# Patient Record
Sex: FEMALE | Race: WHITE | NOT HISPANIC OR LATINO | Employment: PART TIME | URBAN - METROPOLITAN AREA
[De-identification: names, ages, dates, MRNs, and addresses within clinical notes are randomized per-mention and may not be internally consistent; named-entity substitution may affect disease eponyms.]

---

## 2023-04-21 ENCOUNTER — TELEMEDICINE (OUTPATIENT)
Dept: PRIMARY CARE | Facility: CLINIC | Age: 43
End: 2023-04-21
Payer: COMMERCIAL

## 2023-04-21 DIAGNOSIS — J01.00 SUBACUTE MAXILLARY SINUSITIS: Primary | ICD-10-CM

## 2023-04-21 PROBLEM — M62.08 DIASTASIS RECTI: Status: ACTIVE | Noted: 2023-04-21

## 2023-04-21 PROBLEM — E73.9 LACTOSE INTOLERANCE: Status: ACTIVE | Noted: 2023-04-21

## 2023-04-21 PROBLEM — J45.990 EXERCISE-INDUCED ASTHMA (HHS-HCC): Status: ACTIVE | Noted: 2023-04-21

## 2023-04-21 PROBLEM — M21.40 FLAT FOOT: Status: ACTIVE | Noted: 2023-04-21

## 2023-04-21 PROBLEM — D72.819 WBC DECREASED: Status: ACTIVE | Noted: 2023-04-21

## 2023-04-21 PROBLEM — R10.33 UMBILICAL PAIN: Status: ACTIVE | Noted: 2023-04-21

## 2023-04-21 PROBLEM — J30.9 ALLERGIC RHINITIS: Status: ACTIVE | Noted: 2023-04-21

## 2023-04-21 PROBLEM — E55.9 VITAMIN D DEFICIENCY: Status: ACTIVE | Noted: 2023-04-21

## 2023-04-21 PROBLEM — N92.0 MENORRHAGIA WITH REGULAR CYCLE: Status: ACTIVE | Noted: 2023-04-21

## 2023-04-21 PROBLEM — E04.1 THYROID NODULE: Status: ACTIVE | Noted: 2023-04-21

## 2023-04-21 PROBLEM — J02.9 SORE THROAT: Status: ACTIVE | Noted: 2023-04-21

## 2023-04-21 PROCEDURE — 99213 OFFICE O/P EST LOW 20 MIN: CPT | Performed by: NURSE PRACTITIONER

## 2023-04-21 RX ORDER — VIT C/E/ZN/COPPR/LUTEIN/ZEAXAN 250MG-90MG
50 CAPSULE ORAL
COMMUNITY
Start: 2021-12-21

## 2023-04-21 RX ORDER — CETIRIZINE HYDROCHLORIDE 10 MG/1
10 TABLET ORAL AS NEEDED
COMMUNITY

## 2023-04-21 RX ORDER — ERYTHROMYCIN 5 MG/G
OINTMENT OPHTHALMIC
COMMUNITY
End: 2023-05-09 | Stop reason: ALTCHOICE

## 2023-04-21 RX ORDER — AMOXICILLIN AND CLAVULANATE POTASSIUM 875; 125 MG/1; MG/1
875 TABLET, FILM COATED ORAL 2 TIMES DAILY
Qty: 14 TABLET | Refills: 0 | Status: SHIPPED | OUTPATIENT
Start: 2023-04-21 | End: 2023-04-28

## 2023-04-21 ASSESSMENT — ENCOUNTER SYMPTOMS
OCCASIONAL FEELINGS OF UNSTEADINESS: 0
DEPRESSION: 0
LOSS OF SENSATION IN FEET: 0

## 2023-04-21 ASSESSMENT — PATIENT HEALTH QUESTIONNAIRE - PHQ9
2. FEELING DOWN, DEPRESSED OR HOPELESS: NOT AT ALL
SUM OF ALL RESPONSES TO PHQ9 QUESTIONS 1 AND 2: 0
1. LITTLE INTEREST OR PLEASURE IN DOING THINGS: NOT AT ALL

## 2023-04-21 NOTE — PROGRESS NOTES
An interactive audio and video telecommunication system which permits real time communications between the patient (at the originating site) and provider (at the distant site) was utilized to provide this telehealth service.  Verbal consent was requested and obtained from the patient for this telehealth visit.     Subjective   Patient ID: Camelia Brown is a 43 y.o. female who presents for Sinus Problem.    Started Monday  Pressure in forehead  And in cheecks  Yellow mucous  Streaks or dots blood  Currently taking zyrtec  Mucinex D  Had eye infections last Wednesday  Erythromycin eye gel  Will finish today  No fevers sweats or chill  No SOB wheeze   Minimal coughing  No HA, just pressure          Review of Systems  ROS completely negative except what was mentioned in the HPI.  Problem List, surgical, social, and family histories which were reviewed and updated as necessary within the EMR. I also personally reviewed the notes, labs, and imaging that pertained to what was documented or discussed in the HPI.      Objective   Physical Exam  Vitals and nursing note reviewed.   Constitutional:       Appearance: Normal appearance.   HENT:      Head: Normocephalic and atraumatic.      Right Ear: External ear normal.      Left Ear: External ear normal.      Nose: Nose normal.      Mouth/Throat:      Mouth: Mucous membranes are moist.   Eyes:      Extraocular Movements: Extraocular movements intact.      Conjunctiva/sclera: Conjunctivae normal.   Pulmonary:      Effort: Pulmonary effort is normal.   Musculoskeletal:      Cervical back: Normal range of motion and neck supple.   Neurological:      General: No focal deficit present.      Mental Status: She is alert and oriented to person, place, and time. Mental status is at baseline.   Psychiatric:         Mood and Affect: Mood normal.         Behavior: Behavior normal.         Thought Content: Thought content normal.         Judgment: Judgment normal.         There were no  vitals taken for this visit.    Assessment/Plan   Problem List Items Addressed This Visit       Subacute maxillary sinusitis - Primary     Discussed flonase, mucinex, and nasal saline  Short term use of decongestants  Augmentin if not improving         Relevant Medications    amoxicillin-pot clavulanate (Augmentin) 875-125 mg tablet

## 2023-04-21 NOTE — ASSESSMENT & PLAN NOTE
Discussed flonase, mucinex, and nasal saline  Short term use of decongestants  Augmentin if not improving

## 2023-04-28 ENCOUNTER — TELEPHONE (OUTPATIENT)
Dept: PRIMARY CARE | Facility: CLINIC | Age: 43
End: 2023-04-28
Payer: COMMERCIAL

## 2023-04-28 DIAGNOSIS — R05.9 COUGH, UNSPECIFIED TYPE: ICD-10-CM

## 2023-04-28 RX ORDER — ALBUTEROL SULFATE 90 UG/1
2 AEROSOL, METERED RESPIRATORY (INHALATION) EVERY 4 HOURS PRN
Qty: 8 G | Refills: 5 | Status: SHIPPED | OUTPATIENT
Start: 2023-04-28 | End: 2024-04-26 | Stop reason: SDUPTHER

## 2023-04-28 RX ORDER — BENZONATATE 100 MG/1
100 CAPSULE ORAL 3 TIMES DAILY PRN
Qty: 20 CAPSULE | Refills: 0 | Status: SHIPPED | OUTPATIENT
Start: 2023-04-28 | End: 2023-05-05

## 2023-04-28 NOTE — TELEPHONE ENCOUNTER
Regarding: Dr De La Rosa   Contact: [phone]  Yes I would like to try whatever the  recommends.     [facility] please.     Thank you!

## 2023-05-09 ENCOUNTER — OFFICE VISIT (OUTPATIENT)
Dept: PRIMARY CARE | Facility: CLINIC | Age: 43
End: 2023-05-09
Payer: COMMERCIAL

## 2023-05-09 VITALS
TEMPERATURE: 97.2 F | WEIGHT: 150.4 LBS | HEART RATE: 116 BPM | BODY MASS INDEX: 22.87 KG/M2 | OXYGEN SATURATION: 96 % | SYSTOLIC BLOOD PRESSURE: 111 MMHG | DIASTOLIC BLOOD PRESSURE: 62 MMHG

## 2023-05-09 DIAGNOSIS — J01.00 SUBACUTE MAXILLARY SINUSITIS: ICD-10-CM

## 2023-05-09 DIAGNOSIS — J02.8 PHARYNGITIS DUE TO OTHER ORGANISM: Primary | ICD-10-CM

## 2023-05-09 LAB — POC RAPID STREP: NEGATIVE

## 2023-05-09 PROCEDURE — 87880 STREP A ASSAY W/OPTIC: CPT | Performed by: NURSE PRACTITIONER

## 2023-05-09 PROCEDURE — 1036F TOBACCO NON-USER: CPT | Performed by: NURSE PRACTITIONER

## 2023-05-09 PROCEDURE — 99213 OFFICE O/P EST LOW 20 MIN: CPT | Performed by: NURSE PRACTITIONER

## 2023-05-09 RX ORDER — CEFDINIR 300 MG/1
300 CAPSULE ORAL 2 TIMES DAILY
Qty: 20 CAPSULE | Refills: 0 | Status: SHIPPED | OUTPATIENT
Start: 2023-05-09 | End: 2023-05-19

## 2023-05-09 RX ORDER — GUAIFENESIN 600 MG/1
1200 TABLET, EXTENDED RELEASE ORAL AS NEEDED
COMMUNITY
End: 2024-04-26 | Stop reason: WASHOUT

## 2023-05-09 RX ORDER — BUTYROSPERMUM PARKII(SHEA BUTTER), SIMMONDSIA CHINENSIS (JOJOBA) SEED OIL, ALOE BARBADENSIS LEAF EXTRACT .01; 1; 3.5 G/100G; G/100G; G/100G
250 LIQUID TOPICAL DAILY
COMMUNITY
End: 2023-11-09 | Stop reason: ALTCHOICE

## 2023-05-09 ASSESSMENT — PATIENT HEALTH QUESTIONNAIRE - PHQ9
SUM OF ALL RESPONSES TO PHQ9 QUESTIONS 1 AND 2: 0
1. LITTLE INTEREST OR PLEASURE IN DOING THINGS: NOT AT ALL
2. FEELING DOWN, DEPRESSED OR HOPELESS: NOT AT ALL

## 2023-05-09 NOTE — PROGRESS NOTES
Subjective   Patient ID: Camelia Brown is a 43 y.o. female who presents for sick visit (Ongoing for 1 month/Cough  sputum is yellow/headache/low grade fever and chills started yesterday/Previously had sinus infection).  Sick for 1 mo  2 weeks ago took augmentin  Got better then returned  Coughing  Sputum is yellow  HA  Low grade fever  Chills yesterday  Went to  1.5 weeks ago  Got steroid, helped some  Cant sleep due to cough  No ear pain  Random mild sob, infrequent          Review of Systems  ROS completely negative except what was mentioned in the HPI.  Problem List, surgical, social, and family histories which were reviewed and updated as necessary within the EMR. I also personally reviewed the notes, labs, and imaging that pertained to what was documented or discussed in the HPI.      Objective   Physical Exam  Vitals and nursing note reviewed.   Constitutional:       General: She is not in acute distress.     Appearance: Normal appearance.   HENT:      Head: Normocephalic and atraumatic.      Right Ear: Tympanic membrane, ear canal and external ear normal.      Left Ear: Tympanic membrane, ear canal and external ear normal.      Nose: Nose normal.      Mouth/Throat:      Mouth: Mucous membranes are moist.      Pharynx: Oropharyngeal exudate and posterior oropharyngeal erythema present.   Eyes:      Extraocular Movements: Extraocular movements intact.      Conjunctiva/sclera: Conjunctivae normal.      Pupils: Pupils are equal, round, and reactive to light.   Cardiovascular:      Rate and Rhythm: Normal rate and regular rhythm.      Heart sounds: Normal heart sounds.   Pulmonary:      Effort: Pulmonary effort is normal.      Breath sounds: Normal breath sounds.   Musculoskeletal:         General: Normal range of motion.      Cervical back: Normal range of motion and neck supple.   Skin:     General: Skin is warm and dry.   Neurological:      General: No focal deficit present.      Mental Status: She is alert  and oriented to person, place, and time. Mental status is at baseline.   Psychiatric:         Mood and Affect: Mood normal.         Behavior: Behavior normal.         Thought Content: Thought content normal.         Judgment: Judgment normal.         /62   Pulse (!) 116   Temp 36.2 °C (97.2 °F)   Wt 68.2 kg (150 lb 6.4 oz)   SpO2 96%   BMI 22.87 kg/m²     Assessment/Plan   Problem List Items Addressed This Visit       Subacute maxillary sinusitis    Relevant Medications    cefdinir (Omnicef) 300 mg capsule     Other Visit Diagnoses       Pharyngitis due to other organism    -  Primary    Relevant Medications    cefdinir (Omnicef) 300 mg capsule    Other Relevant Orders    POCT Rapid Strep A manually resulted (Completed)

## 2023-05-12 ENCOUNTER — OFFICE VISIT (OUTPATIENT)
Dept: PRIMARY CARE | Facility: CLINIC | Age: 43
End: 2023-05-12
Payer: COMMERCIAL

## 2023-05-12 ENCOUNTER — LAB (OUTPATIENT)
Dept: LAB | Facility: LAB | Age: 43
End: 2023-05-12
Payer: COMMERCIAL

## 2023-05-12 VITALS
TEMPERATURE: 97.6 F | BODY MASS INDEX: 22.81 KG/M2 | OXYGEN SATURATION: 98 % | DIASTOLIC BLOOD PRESSURE: 72 MMHG | HEART RATE: 88 BPM | SYSTOLIC BLOOD PRESSURE: 105 MMHG | WEIGHT: 150 LBS

## 2023-05-12 DIAGNOSIS — R05.3 PERSISTENT COUGH: Primary | ICD-10-CM

## 2023-05-12 DIAGNOSIS — B37.0 THRUSH: ICD-10-CM

## 2023-05-12 DIAGNOSIS — R05.3 PERSISTENT COUGH: ICD-10-CM

## 2023-05-12 PROCEDURE — 36415 COLL VENOUS BLD VENIPUNCTURE: CPT

## 2023-05-12 PROCEDURE — 85025 COMPLETE CBC W/AUTO DIFF WBC: CPT

## 2023-05-12 PROCEDURE — 1036F TOBACCO NON-USER: CPT | Performed by: NURSE PRACTITIONER

## 2023-05-12 PROCEDURE — 99213 OFFICE O/P EST LOW 20 MIN: CPT | Performed by: NURSE PRACTITIONER

## 2023-05-12 RX ORDER — NYSTATIN 100000 [USP'U]/ML
500000 SUSPENSION ORAL 4 TIMES DAILY
Qty: 473 ML | Refills: 0 | Status: SHIPPED | OUTPATIENT
Start: 2023-05-12 | End: 2023-05-22

## 2023-05-12 RX ORDER — AZELASTINE 1 MG/ML
1 SPRAY, METERED NASAL 2 TIMES DAILY
Qty: 30 ML | Refills: 12 | Status: SHIPPED | OUTPATIENT
Start: 2023-05-12 | End: 2024-05-11

## 2023-05-12 RX ORDER — AZITHROMYCIN 250 MG/1
TABLET, FILM COATED ORAL
Qty: 6 TABLET | Refills: 0 | Status: SHIPPED | OUTPATIENT
Start: 2023-05-12 | End: 2023-05-17

## 2023-05-12 NOTE — PROGRESS NOTES
Assessment/Plan   Problem List Items Addressed This Visit    None  Visit Diagnoses       Persistent cough    -  Primary    chest XR given chronicity  complete cefdinir  try astelin    Relevant Medications    azelastine (Astelin) 137 mcg (0.1 %) nasal spray    Other Relevant Orders    XR chest 2 views    CBC and Auto Differential    Thrush        swish swallow nystatin    Relevant Medications    nystatin (Mycostatin) 100,000 unit/mL suspension            Subjective   Patient ID: Camelia Brown is a 43 y.o. female who presents for No chief complaint on file..  HPI  Cough x month  Steroids x 2 rounds   Abx x 2 rounds   Currently on cefdinir   Stopped tessalon with steroid  Stopped alb  - no dyspnea  Stopped flonase  Using zyrtec  No fever     Component      Latest Ref Rng 1/27/2023   WBC      4.4 - 11.3 x10E9/L 4.4    RBC      4.00 - 5.20 x10E12/L 4.27    HEMOGLOBIN      12.0 - 16.0 g/dL 13.4    HEMATOCRIT      36.0 - 46.0 % 39.9    MCV      80 - 100 fL 93    MCHC      32.0 - 36.0 g/dL 33.6    Platelets      150 - 450 x10E9/L 205    RED CELL DISTRIBUTION WIDTH      11.5 - 14.5 % 11.9    Neutrophils %      40.0 - 80.0 % 57.6    Immature Granulocytes %, Automated      0.0 - 0.9 % 0.0    Lymphocytes %      13.0 - 44.0 % 29.4    Monocytes %      2.0 - 10.0 % 10.0    Eosinophils %      0.0 - 6.0 % 2.3    Basophils %      0.0 - 2.0 % 0.7    Neutrophils Absolute      1.20 - 7.70 x10E9/L 2.53    Lymphocytes Absolute      1.20 - 4.80 x10E9/L 1.29    Monocytes Absolute      0.10 - 1.00 x10E9/L 0.44    Eosinophils Absolute      0.00 - 0.70 x10E9/L 0.10    Basophils Absolute      0.00 - 0.10 x10E9/L 0.03    GLUCOSE      74 - 99 mg/dL 87    SODIUM      136 - 145 mmol/L 139    POTASSIUM      3.5 - 5.3 mmol/L 4.1    CHLORIDE      98 - 107 mmol/L 104    Bicarbonate      21 - 32 mmol/L 28    Anion Gap      10 - 20 mmol/L 11    Blood Urea Nitrogen      6 - 23 mg/dL 13    Creatinine      0.50 - 1.05 mg/dL 0.79    GFR Female      >90  "mL/min/1.73m2 >90    Calcium      8.6 - 10.3 mg/dL 9.2    Albumin      3.4 - 5.0 g/dL 4.2    Alkaline Phosphatase      33 - 110 U/L 62    Total Protein      6.4 - 8.2 g/dL 7.5    AST      9 - 39 U/L 15    Bilirubin Total      0.0 - 1.2 mg/dL 0.9    ALT      7 - 45 U/L 8    Color, Urine      STRAW,YELLOW  STRAW    Appearance, Urine      CLEAR  CLEAR    Specific Gravity, Urine      1.005 - 1.035  1.008    pH, Urine      5.0 - 8.0  7.0    Protein, Urine      NEGATIVE mg/dL NEGATIVE    Glucose, Urine      NEGATIVE mg/dL NEGATIVE    Blood, Urine      NEGATIVE  NEGATIVE    Ketones, Urine      NEGATIVE mg/dL NEGATIVE    Bilirubin, Urine      NEGATIVE  NEGATIVE    Urobilinogen, Urine      0.0 - 1.9 mg/dL <2.0    Nitrite, Urine      NEGATIVE  NEGATIVE    Leukocyte Esterase, Urine      NEGATIVE  NEGATIVE    CHOLESTEROL      0 - 199 mg/dL 135    HDL CHOLESTEROL      mg/dL 35.7 !    Cholesterol/HDL Ratio 3.8    LDL      0 - 99 mg/dL 85    VLDL      0 - 40 mg/dL 15    TRIGLYCERIDES      0 - 149 mg/dL 73    Vitamin D, 25-Hydroxy, Total      ng/mL 41    Thyroid Stimulating Hormone      0.44 - 3.98 mIU/L 1.67       Review of Systems   All other systems reviewed and are negative.      BP Readings from Last 3 Encounters:   05/12/23 105/72   05/09/23 111/62   01/19/23 109/70      Wt Readings from Last 3 Encounters:   05/12/23 68 kg (150 lb)   05/09/23 68.2 kg (150 lb 6.4 oz)   01/19/23 73.5 kg (162 lb)      BMI:   Estimated body mass index is 22.81 kg/m² as calculated from the following:    Height as of 1/19/23: 1.727 m (5' 8\").    Weight as of this encounter: 68 kg (150 lb).    Objective   Physical Exam  Constitutional:       Appearance: Normal appearance.   HENT:      Head: Normocephalic and atraumatic.      Right Ear: Tympanic membrane normal.      Left Ear: Tympanic membrane normal.      Nose: Nose normal.      Mouth/Throat:      Mouth: Mucous membranes are moist.      Comments: Tongue does have some white coating  L buccal canker " sore   Cardiovascular:      Rate and Rhythm: Normal rate and regular rhythm.   Pulmonary:      Effort: Pulmonary effort is normal.      Comments: JACKIE diminished   Musculoskeletal:         General: Normal range of motion.      Cervical back: Normal range of motion.   Skin:     General: Skin is warm and dry.   Neurological:      General: No focal deficit present.      Mental Status: She is alert.   Psychiatric:         Mood and Affect: Mood normal.

## 2023-05-13 LAB
BAND NEUTROPHILS (10*3/UL) BLOOD MANUAL COUNT - WAM: 0.34 X10E9/L (ref 0–0.7)
BASOPHILS (10*3/UL) IN BLOOD BY MANUAL COUNT - WAM: 0.22 X10E9/L (ref 0–0.1)
BASOPHILS/100 LEUKOCYTES IN BLOOD BY MANUAL COUNT - WAM: 4 %
EOSINOPHILS (10*3/UL) IN BLOOD BY MANUAL COUNT - WAM: 0.28 X10E9/L (ref 0–0.7)
EOSINOPHILS/100 LEUKOCYTES IN BLOOD BY MANUAL COUNT - WAM: 5 % (ref 0–6)
ERYTHROCYTE DISTRIBUTION WIDTH (RATIO) BY AUTOMATED COUNT: 11.9 % (ref 11.5–14.5)
ERYTHROCYTE MEAN CORPUSCULAR HEMOGLOBIN CONCENTRATION (G/DL) BY AUTOMATED: 34.1 G/DL (ref 32–36)
ERYTHROCYTE MEAN CORPUSCULAR VOLUME (FL) BY AUTOMATED COUNT: 93 FL (ref 80–100)
ERYTHROCYTES (10*6/UL) IN BLOOD BY AUTOMATED COUNT: 4.22 X10E12/L (ref 4–5.2)
HEMATOCRIT (%) IN BLOOD BY AUTOMATED COUNT: 39.3 % (ref 36–46)
HEMOGLOBIN (G/DL) IN BLOOD: 13.4 G/DL (ref 12–16)
IMMATURE GRANULOCYTES/100 LEUKOCYTES IN BLOOD BY AUTOMATED COUNT: 0.2 % (ref 0–0.9)
LEUKOCYTES (10*3/UL) IN BLOOD BY AUTOMATED COUNT: 5.6 X10E9/L (ref 4.4–11.3)
LYMPHOCYTES (10*3/UL) IN BLOOD BY MANUAL COUNT - WAM: 2.35 X10E9/L (ref 1.2–4.8)
LYMPHOCYTES VARIANT/100 LEUKOCYTES IN BLOOD - WAM: 4 % (ref 0–2)
LYMPHOCYTES/100 LEUKOCYTES IN BLOOD BY MANUAL COUNT - WAM: 42 % (ref 13–44)
MANUAL DIFFERENTIAL Y/N: NORMAL
MONOCYTES (10*3/UL) IN BLOOD BY MANUAL COUNT - WAM: 0.78 X10E9/L (ref 0.1–1)
MONOCYTES/100 LEUKOCYTES IN BLOOD BY MANUAL COUNT - WAM: 14 % (ref 2–10)
NEUTROPHILS (SEGS+BANDS) (10*3/UL) MANUAL COUNT - WAM: 1.74 X10E9/L (ref 1.2–7.7)
NEUTROPHILS BAND FORM/100 LEUKOCYTES IN BLOOD BY MANUAL COUNT - WAM: 6 % (ref 0–5)
PLATELET CLUMP (PRESENCE) IN BLOOD BY LIGHT MICROSCOPY: PRESENT
PLATELETS (10*3/UL) IN BLOOD AUTOMATED COUNT: 221 X10E9/L (ref 150–450)
PLATELETS GIANT PRESENCE IN BLOOD BY LIGHT MICROSCOPY: NORMAL
RBC MORPHOLOGY IN BLOOD: NORMAL
REACTIVE LYMPH: NORMAL
SEGMENTED NEUTROPHILS (10*3/UL) BLOOD MANUAL - WAM: 1.4 X10E9/L (ref 1.2–7)
SEGMENTED NEUTROPHILS/100 LEUKOCYTES BY MANUAL COUNT -: 25 % (ref 40–80)
VARIANT LYMPHOCYTES (10*3/UL) BLOOD MANUAL COUNT - WAM: 0.22 X10E9/L (ref 0–0.5)

## 2023-05-16 ENCOUNTER — TELEPHONE (OUTPATIENT)
Dept: PRIMARY CARE | Facility: CLINIC | Age: 43
End: 2023-05-16
Payer: COMMERCIAL

## 2023-05-16 NOTE — TELEPHONE ENCOUNTER
Regarding: Dizzy  ----- Message from Arielle Mayer MD sent at 5/16/2023 11:12 AM EDT -----  If the Zpack has helped would finish it (if able)  Can send her in zofran 4mg every 6 hours for nausea if desires #20  Would like her to se ENT for these symptoms: dizziness, pulsatile tinnitus, tongue  Referral in   If wihes to do diflucan 150mg x 5 days for the thrush can send that in also     ----- Message from Camelia Brown to Carmelita LENA Jain-CNP sent at 5/16/2023  8:42 AM -----   Yesterday and today I’ve been dizzy periodically and threw up after being dizzy. I’m assuming it’s the azithromycin. Should I DC the azithromycin or just finish the last one up today?    I’ve also been able to feel my heartbeat in my R ear periodically the past 3 days. That has happened toward the evening and lasts for approximately 30 minutes.     My throat is much better. Still looks like my tongue has thrush though. Still coughing some but not as much. I haven’t been back to work since last Monday. Too fatigued and couldn’t talk. I was going to attempt to go in today but then the dizziness started yesterday and then this morning woke up dizzy.     Thank you for your time,  Camelia Brown

## 2023-05-16 NOTE — TELEPHONE ENCOUNTER
Spoke with Pt  Relayed message  Pt stated she went to the ED today and was already given medication  Relayed that if anything changes to give the office a call

## 2023-05-19 LAB
IGA (MG/DL) IN SER/PLAS: 217 MG/DL (ref 70–400)
IGG (MG/DL) IN SER/PLAS: 1500 MG/DL (ref 700–1600)
IGG (MG/DL) IN SER/PLAS: 1500 MG/DL (ref 700–1600)
IGG SUBCLASS 1 (MG/DL) IN SERUM: 1030 MG/DL (ref 490–1140)
IGG SUBCLASS 2 (MG/DL) IN SERUM: 386 MG/DL (ref 150–640)
IGG SUBCLASS 3 (MG/DL) IN SERUM: 53 MG/DL (ref 11–85)
IGG SUBCLASS 4 (MG/DL) IN SERUM: 22 MG/DL (ref 3–200)
IGM (MG/DL) IN SER/PLAS: 333 MG/DL (ref 40–230)

## 2023-05-23 LAB
COMPLEMENT TOTAL (CH50): 38.9 U/ML (ref 38.7–89.9)
DIPHTHERIA ANTIBODY: 0.9 IU/ML
HAEMOPHILUS INFLUENZAE B AB IGG: 5.4 UG/ML
PNEUMO SEROTYPE INTERPRETATION: NORMAL
SEROTYPE 1, VIRC: >12.18 UG/ML
SEROTYPE 10A(34), VIRC: 10.29 UG/ML
SEROTYPE 11A(43), VIRC: >32.14 UG/ML
SEROTYPE 12F, VIRC: 0.3 UG/ML
SEROTYPE 14, VIRC: 1.03 UG/ML
SEROTYPE 15B(54), VIRC: 1.08 UG/ML
SEROTYPE 17F, VIRC: 1.7 UG/ML
SEROTYPE 18C(56), VIRC: 15.64 UG/ML
SEROTYPE 19A(57), VIRC: 12.43
SEROTYPE 19F, VIRC: 1.27 UG/ML
SEROTYPE 2, VIRC: 0.55 UG/ML
SEROTYPE 20, VIRC: 3.94 UG/ML
SEROTYPE 22F, VIRC: 1.09 UG/ML
SEROTYPE 23F, VIRC: 0.58 UG/ML
SEROTYPE 3, VIRC: 1.34 UG/ML
SEROTYPE 33F(70), VIRC: 0.26 UG/ML
SEROTYPE 4, VIRC: 0.11 UG/ML
SEROTYPE 5, VIRC: 2.75 UG/ML
SEROTYPE 6B(26), VIRC: 0.31 UG/ML
SEROTYPE 7F(51), VIRC: 1.78 UG/ML
SEROTYPE 8, VIRC: 0.2 UG/ML
SEROTYPE 9N, VIRC: 2.4 UG/ML
SEROTYPE 9V(68), VIRC: 2.38 UG/ML

## 2023-06-27 ENCOUNTER — TELEPHONE (OUTPATIENT)
Dept: PRIMARY CARE | Facility: CLINIC | Age: 43
End: 2023-06-27
Payer: COMMERCIAL

## 2023-06-29 ENCOUNTER — CLINICAL SUPPORT (OUTPATIENT)
Dept: PRIMARY CARE | Facility: CLINIC | Age: 43
End: 2023-06-29
Payer: COMMERCIAL

## 2023-06-29 DIAGNOSIS — Z23 ENCOUNTER FOR IMMUNIZATION: Primary | ICD-10-CM

## 2023-06-29 PROCEDURE — 90471 IMMUNIZATION ADMIN: CPT | Performed by: NURSE PRACTITIONER

## 2023-06-29 PROCEDURE — 90732 PPSV23 VACC 2 YRS+ SUBQ/IM: CPT | Performed by: NURSE PRACTITIONER

## 2023-08-07 ENCOUNTER — TELEPHONE (OUTPATIENT)
Dept: PRIMARY CARE | Facility: CLINIC | Age: 43
End: 2023-08-07
Payer: COMMERCIAL

## 2023-08-07 NOTE — TELEPHONE ENCOUNTER
Spoke with Pt  Relayed order is in and can get it 6 weeks from the time the immunization was given  Pt verbally understood

## 2023-08-07 NOTE — TELEPHONE ENCOUNTER
----- Message from Camelia Kevin sent at 8/7/2023 10:33 AM EDT -----  Regarding: Blood Work  Contact: [phone]  Dr Jacques recommended getting a re-check on blood work to see if the Pnuemovax shot worked. He said to do 6 weeks after the shot was given. I see and order in [facility] but it said it expires July? I was assuming that was the recheck order or do I need to call Dr Reyes office for the blood work order?

## 2023-08-22 ENCOUNTER — LAB (OUTPATIENT)
Dept: LAB | Facility: LAB | Age: 43
End: 2023-08-22
Payer: COMMERCIAL

## 2023-08-22 DIAGNOSIS — Z00.00 ROUTINE ADULT HEALTH MAINTENANCE: ICD-10-CM

## 2023-08-22 PROCEDURE — 36415 COLL VENOUS BLD VENIPUNCTURE: CPT

## 2023-08-22 PROCEDURE — 86317 IMMUNOASSAY INFECTIOUS AGENT: CPT

## 2023-08-29 LAB
PNEUMO SEROTYPE INTERPRETATION: NORMAL
SEROTYPE 1, VIRC: 16.68 UG/ML
SEROTYPE 10A(34), VIRC: 6.87 UG/ML
SEROTYPE 11A(43), VIRC: 1.52 UG/ML
SEROTYPE 12F, VIRC: 1.39 UG/ML
SEROTYPE 14, VIRC: 4.82 UG/ML
SEROTYPE 15B(54), VIRC: 8.23 UG/ML
SEROTYPE 17F, VIRC: 4.27 UG/ML
SEROTYPE 18C(56), VIRC: 4.98 UG/ML
SEROTYPE 19A(57), VIRC: 7.59 UG/ML
SEROTYPE 19F, VIRC: 4.9 UG/ML
SEROTYPE 2, VIRC: 8.65 UG/ML
SEROTYPE 20, VIRC: 3.77 UG/ML
SEROTYPE 22F, VIRC: 1.79 UG/ML
SEROTYPE 23F, VIRC: 1.81 UG/ML
SEROTYPE 3, VIRC: 3.13 UG/ML
SEROTYPE 33F(70), VIRC: 0.67 UG/ML
SEROTYPE 4, VIRC: 1.95 UG/ML
SEROTYPE 5, VIRC: 10.2 UG/ML
SEROTYPE 6B(26), VIRC: 1.09 UG/ML
SEROTYPE 7F(51), VIRC: 11.26 UG/ML
SEROTYPE 8, VIRC: 2.17 UG/ML
SEROTYPE 9N, VIRC: 4.82 UG/ML
SEROTYPE 9V(68), VIRC: 3.16 UG/ML

## 2023-10-05 ENCOUNTER — APPOINTMENT (OUTPATIENT)
Dept: RADIOLOGY | Facility: CLINIC | Age: 43
End: 2023-10-05
Payer: COMMERCIAL

## 2023-10-05 DIAGNOSIS — Z12.31 SCREENING MAMMOGRAM FOR BREAST CANCER: ICD-10-CM

## 2023-10-12 ENCOUNTER — APPOINTMENT (OUTPATIENT)
Dept: RADIOLOGY | Facility: CLINIC | Age: 43
End: 2023-10-12
Payer: COMMERCIAL

## 2023-10-19 ENCOUNTER — ANCILLARY PROCEDURE (OUTPATIENT)
Dept: RADIOLOGY | Facility: CLINIC | Age: 43
End: 2023-10-19
Payer: COMMERCIAL

## 2023-10-19 DIAGNOSIS — Z12.31 SCREENING MAMMOGRAM FOR BREAST CANCER: ICD-10-CM

## 2023-10-19 PROCEDURE — 77067 SCR MAMMO BI INCL CAD: CPT | Mod: BILATERAL PROCEDURE | Performed by: RADIOLOGY

## 2023-10-19 PROCEDURE — 77063 BREAST TOMOSYNTHESIS BI: CPT | Mod: BILATERAL PROCEDURE | Performed by: RADIOLOGY

## 2023-10-19 PROCEDURE — 77067 SCR MAMMO BI INCL CAD: CPT

## 2023-11-08 ENCOUNTER — LAB (OUTPATIENT)
Dept: LAB | Facility: LAB | Age: 43
End: 2023-11-08
Payer: COMMERCIAL

## 2023-11-08 DIAGNOSIS — E73.9 LACTOSE INTOLERANCE: ICD-10-CM

## 2023-11-08 PROCEDURE — 82653 EL-1 FECAL QUANTITATIVE: CPT

## 2023-11-09 ENCOUNTER — OFFICE VISIT (OUTPATIENT)
Dept: PRIMARY CARE | Facility: CLINIC | Age: 43
End: 2023-11-09
Payer: COMMERCIAL

## 2023-11-09 VITALS
WEIGHT: 157.2 LBS | HEIGHT: 68 IN | SYSTOLIC BLOOD PRESSURE: 120 MMHG | TEMPERATURE: 97.9 F | DIASTOLIC BLOOD PRESSURE: 79 MMHG | HEART RATE: 94 BPM | OXYGEN SATURATION: 95 % | BODY MASS INDEX: 23.82 KG/M2

## 2023-11-09 DIAGNOSIS — M21.961 DEFORMITY OF BOTH FEET: ICD-10-CM

## 2023-11-09 DIAGNOSIS — R05.3 PERSISTENT COUGH: Primary | ICD-10-CM

## 2023-11-09 DIAGNOSIS — M21.962 DEFORMITY OF BOTH FEET: ICD-10-CM

## 2023-11-09 DIAGNOSIS — M21.40 PES PLANUS, UNSPECIFIED LATERALITY: ICD-10-CM

## 2023-11-09 PROCEDURE — 1036F TOBACCO NON-USER: CPT | Performed by: NURSE PRACTITIONER

## 2023-11-09 PROCEDURE — 99213 OFFICE O/P EST LOW 20 MIN: CPT | Performed by: NURSE PRACTITIONER

## 2023-11-09 RX ORDER — AZITHROMYCIN 250 MG/1
TABLET, FILM COATED ORAL
Qty: 6 TABLET | Refills: 0 | Status: SHIPPED | OUTPATIENT
Start: 2023-11-09 | End: 2023-11-14

## 2023-11-09 RX ORDER — BENZONATATE 100 MG/1
100 CAPSULE ORAL 3 TIMES DAILY PRN
Qty: 30 CAPSULE | Refills: 0 | Status: SHIPPED | OUTPATIENT
Start: 2023-11-09 | End: 2023-11-19

## 2023-11-09 RX ORDER — AMOXICILLIN AND CLAVULANATE POTASSIUM 875; 125 MG/1; MG/1
875 TABLET, FILM COATED ORAL 2 TIMES DAILY
Qty: 14 TABLET | Refills: 0 | Status: SHIPPED | OUTPATIENT
Start: 2023-11-09 | End: 2023-11-16

## 2023-11-09 ASSESSMENT — PATIENT HEALTH QUESTIONNAIRE - PHQ9
SUM OF ALL RESPONSES TO PHQ9 QUESTIONS 1 AND 2: 0
2. FEELING DOWN, DEPRESSED OR HOPELESS: NOT AT ALL
1. LITTLE INTEREST OR PLEASURE IN DOING THINGS: NOT AT ALL

## 2023-11-09 NOTE — PROGRESS NOTES
"Problem List Items Addressed This Visit       Flat foot    Relevant Orders    [employer]     Other Visit Diagnoses       Persistent cough    -  Primary    had same 5/2023 with multiple failed treatments  augmentin plus azith now  alb q4-6 then wean  tessalon  chest XR if not improving  fu prn    Relevant Medications    amoxicillin-pot clavulanate (Augmentin) 875-125 mg tablet    azithromycin (Zithromax) 250 mg tablet    benzonatate (Tessalon) 100 mg capsule    Other Relevant Orders    XR chest 2 views    Deformity of both feet        Relevant Orders    [employer]             Subjective   Patient ID: Camelia Brown is a 43 y.o. female who presents for Sick Visit (6 weeks cough /Post nasal drip/Lost voice Monday ).  HPI  5/12/23 XR chest:  1.  No evidence of acute cardiopulmonary process.     No treatment over past 6 weeks  No fever  Maybe little wheeze  - alb may have helped a little   No dyspnea  Sore throat from PND  No ear pain  No rash  No B/B  Mucinex D helped to dry mucus  Yellow sputum       Review of Systems   All other systems reviewed and are negative.      BP Readings from Last 3 Encounters:   11/09/23 120/79   05/12/23 105/72   05/09/23 111/62      Wt Readings from Last 3 Encounters:   11/09/23 71.3 kg (157 lb 3.2 oz)   05/12/23 68 kg (150 lb)   05/09/23 68.2 kg (150 lb 6.4 oz)      BMI:   Estimated body mass index is 23.9 kg/m² as calculated from the following:    Height as of this encounter: 1.727 m (5' 8\").    Weight as of this encounter: 71.3 kg (157 lb 3.2 oz).    Objective   Physical Exam  Constitutional:       General: She is not in acute distress.  HENT:      Head: Normocephalic and atraumatic.      Right Ear: Tympanic membrane and external ear normal.      Left Ear: Tympanic membrane and external ear normal.      Nose: Nose normal.      Mouth/Throat:      Mouth: Mucous membranes are moist.   Eyes:      Extraocular Movements: Extraocular movements intact.      Conjunctiva/sclera: " Conjunctivae normal.   Cardiovascular:      Rate and Rhythm: Normal rate and regular rhythm.      Pulses: Normal pulses.   Pulmonary:      Effort: Pulmonary effort is normal.      Breath sounds: Normal breath sounds.      Comments: Deep cough   Musculoskeletal:         General: Normal range of motion.      Cervical back: Normal range of motion and neck supple.   Skin:     General: Skin is warm and dry.   Neurological:      General: No focal deficit present.      Mental Status: She is alert.   Psychiatric:         Mood and Affect: Mood normal.

## 2023-11-11 LAB — ELASTASE PANC STL-MCNT: >800 UG/G

## 2023-12-19 ENCOUNTER — TELEPHONE (OUTPATIENT)
Dept: PRIMARY CARE | Facility: CLINIC | Age: 43
End: 2023-12-19
Payer: COMMERCIAL

## 2023-12-19 NOTE — TELEPHONE ENCOUNTER
Patient called and left  stating she has had a cough for 10 weeks that is not going away and would like a appt. Clinical please triage further and send back If needed scheduled

## 2023-12-19 NOTE — TELEPHONE ENCOUNTER
Spoke to pt her cough is getting better not as bad as it was she is coughing up yellow mucus periodically   Advised pt she can go to urgent care to get checked out will check with Carmelita to see if we can squeeze her in anywhere pt states she is out of town till Friday

## 2023-12-20 ENCOUNTER — TELEMEDICINE (OUTPATIENT)
Dept: PRIMARY CARE | Facility: CLINIC | Age: 43
End: 2023-12-20
Payer: COMMERCIAL

## 2023-12-20 DIAGNOSIS — R05.3 PERSISTENT COUGH: Primary | ICD-10-CM

## 2023-12-20 DIAGNOSIS — J45.990 EXERCISE-INDUCED ASTHMA (HHS-HCC): ICD-10-CM

## 2023-12-20 PROCEDURE — 99213 OFFICE O/P EST LOW 20 MIN: CPT | Performed by: NURSE PRACTITIONER

## 2023-12-20 RX ORDER — FLUTICASONE PROPIONATE 110 UG/1
1 AEROSOL, METERED RESPIRATORY (INHALATION)
Qty: 12 G | Refills: 5 | Status: SHIPPED | OUTPATIENT
Start: 2023-12-20 | End: 2024-02-26 | Stop reason: WASHOUT

## 2023-12-20 RX ORDER — PREDNISONE 20 MG/1
TABLET ORAL
Qty: 12 TABLET | Refills: 0 | Status: SHIPPED | OUTPATIENT
Start: 2023-12-20 | End: 2023-12-30

## 2023-12-20 ASSESSMENT — ENCOUNTER SYMPTOMS
COUGH: 1
RHINORRHEA: 0
MYALGIAS: 0
HEADACHES: 1
WHEEZING: 0
SHORTNESS OF BREATH: 0
WEIGHT LOSS: 0
HEMOPTYSIS: 0
SWEATS: 0
HEARTBURN: 0
SORE THROAT: 0
CHILLS: 0
FEVER: 0

## 2023-12-20 ASSESSMENT — PATIENT HEALTH QUESTIONNAIRE - PHQ9
1. LITTLE INTEREST OR PLEASURE IN DOING THINGS: NOT AT ALL
SUM OF ALL RESPONSES TO PHQ9 QUESTIONS 1 AND 2: 0
2. FEELING DOWN, DEPRESSED OR HOPELESS: NOT AT ALL

## 2023-12-20 NOTE — PROGRESS NOTES
An interactive audio/visual  telecommunication system which permits real time communications between the patient (at the originating site) and provider (at the distant site) was utilized to provide this telehealth service.    Verbal consent was requested and obtained from the patient for this telehealth visit.  We have confirmed the patient wishes to see me, Carmelita Leon, a board certified nurse practitioner with an active [place] advanced practice license as well as verified the patient's identity and physical location in [place].        Answers submitted by the patient for this visit:  Cough Questionnaire (Submitted on 12/20/2023)  Chief Complaint: Cough  Chronicity: recurrent  Onset: more than 1 month ago  Progression since onset: waxing and waning  Frequency: hourly  Cough characteristics: productive of sputum  chest pain: No  chills: No  ear congestion: No  ear pain: No  fever: No  headaches: Yes  heartburn: No  hemoptysis: No  myalgias: No  nasal congestion: No  postnasal drip: Yes  rash: No  rhinorrhea: No  shortness of breath: No  sore throat: No  sweats: No  weight loss: No  wheezing: No  Aggravated by: nothing    Problem List Items Addressed This Visit       Exercise-induced asthma    Overview     Alb prn           Other Visit Diagnoses       Persistent cough    -  Primary    get chest XR  trial steroid taper and ICS   - PMH exercise-induced asthma   prn fu should be IO only   may need PFTs and/or pulm    Relevant Medications    predniSONE (Deltasone) 20 mg tablet    fluticasone (Flovent) 110 mcg/actuation inhaler             Subjective   Patient ID: Camelia Brown is a 43 y.o. female who presents for persistant cough (Cough for 10weeks getting better periododically coughing up yellow mucus cough gets better then gets worse/Has done couple rounds of antibiotics you saw her on 5/12/23 and 11/9/23 for this cough).  Cough  This is a recurrent problem. The current episode started more than 1 month ago. The problem  "has been waxing and waning. The problem occurs hourly. The cough is Productive of sputum. Associated symptoms include headaches and postnasal drip. Pertinent negatives include no chest pain, chills, ear congestion, ear pain, fever, heartburn, hemoptysis, myalgias, nasal congestion, rash, rhinorrhea, sore throat, shortness of breath, sweats, weight loss or wheezing. Nothing aggravates the symptoms.       11/9/23 my visit:  Persistent cough    -  Primary      had same 5/2023 with multiple failed treatments  augmentin plus azith now  alb q4-6 then wean  tessalon  chest XR if not improving  fu prn     Relevant Medications     amoxicillin-pot clavulanate (Augmentin) 875-125 mg tablet     azithromycin (Zithromax) 250 mg tablet     benzonatate (Tessalon) 100 mg capsule     Other Relevant Orders     XR chest 2 views     She did not get chest XR  No significant change with antibiotics  Last used alb about an hour ago  - feels like ae improves   - using BID now   Occ crackle sound in the lungs  No fever entire course of this  No sore throat or ear pain    Review of Systems   Constitutional:  Negative for chills, fever and weight loss.   HENT:  Positive for postnasal drip. Negative for ear pain, rhinorrhea and sore throat.    Respiratory:  Positive for cough. Negative for hemoptysis, shortness of breath and wheezing.    Cardiovascular:  Negative for chest pain.   Gastrointestinal:  Negative for heartburn.   Musculoskeletal:  Negative for myalgias.   Skin:  Negative for rash.   Neurological:  Positive for headaches.   All other systems reviewed and are negative.      BP Readings from Last 3 Encounters:   11/09/23 120/79   05/12/23 105/72   05/09/23 111/62      Wt Readings from Last 3 Encounters:   11/09/23 71.3 kg (157 lb 3.2 oz)   05/12/23 68 kg (150 lb)   05/09/23 68.2 kg (150 lb 6.4 oz)      BMI:   Estimated body mass index is 23.9 kg/m² as calculated from the following:    Height as of 11/9/23: 1.727 m (5' 8\").    Weight as " of 11/9/23: 71.3 kg (157 lb 3.2 oz).    Objective   Physical Exam  Gen: Alert, NAD  Respiratory:  resp effort NL  Neuro:  coordination intact   Mood: normal    Ambulates to car

## 2023-12-22 ENCOUNTER — ANCILLARY PROCEDURE (OUTPATIENT)
Dept: RADIOLOGY | Facility: CLINIC | Age: 43
End: 2023-12-22
Payer: COMMERCIAL

## 2023-12-22 DIAGNOSIS — R05.3 PERSISTENT COUGH: ICD-10-CM

## 2023-12-22 PROCEDURE — 71046 X-RAY EXAM CHEST 2 VIEWS: CPT

## 2023-12-22 PROCEDURE — 71046 X-RAY EXAM CHEST 2 VIEWS: CPT | Performed by: RADIOLOGY

## 2024-01-20 PROBLEM — Z00.00 ROUTINE ADULT HEALTH MAINTENANCE: Status: ACTIVE | Noted: 2024-01-20

## 2024-02-15 ENCOUNTER — APPOINTMENT (OUTPATIENT)
Dept: PRIMARY CARE | Facility: CLINIC | Age: 44
End: 2024-02-15
Payer: COMMERCIAL

## 2024-02-26 ENCOUNTER — TELEMEDICINE (OUTPATIENT)
Dept: PRIMARY CARE | Facility: CLINIC | Age: 44
End: 2024-02-26
Payer: COMMERCIAL

## 2024-02-26 ENCOUNTER — PATIENT MESSAGE (OUTPATIENT)
Dept: PRIMARY CARE | Facility: CLINIC | Age: 44
End: 2024-02-26

## 2024-02-26 DIAGNOSIS — U07.1 COVID-19: Primary | ICD-10-CM

## 2024-02-26 PROCEDURE — 1036F TOBACCO NON-USER: CPT | Performed by: NURSE PRACTITIONER

## 2024-02-26 PROCEDURE — 99213 OFFICE O/P EST LOW 20 MIN: CPT | Performed by: NURSE PRACTITIONER

## 2024-02-26 RX ORDER — VITAMIN B COMPLEX
3 CAPSULE ORAL DAILY
COMMUNITY

## 2024-02-26 NOTE — PROGRESS NOTES
An interactive audio and video telecommunication system which permits real time communications between the patient (at the originating site) and provider (at the distant site) was utilized to provide this telehealth service.  Verbal consent was requested and obtained from the patient for this telehealth visit.     Subjective   Patient ID: Camelia Brown is a 44 y.o. female who presents for Sick Visit (Covid positive- yesterday/Symptoms Friday evening /Congestion, sore throat, and headache ).    Symptom onset 2/23  Covid positive 2/25  No covid vaccines  Current symptoms  Congestion  Sore throat  HA resolved  No SOB  No CP  Had covid 2021          Review of Systems  ROS completely negative except what was mentioned in the HPI.  Problem List, surgical, social, and family histories which were reviewed and updated as necessary within the EMR. I also personally reviewed the notes, labs, and imaging that pertained to what was documented or discussed in the HPI.    Objective   Physical Exam  Vitals and nursing note reviewed.   Constitutional:       Appearance: Normal appearance.   HENT:      Head: Normocephalic and atraumatic.      Right Ear: External ear normal.      Left Ear: External ear normal.      Nose: Nose normal.      Mouth/Throat:      Mouth: Mucous membranes are moist.   Eyes:      Extraocular Movements: Extraocular movements intact.      Conjunctiva/sclera: Conjunctivae normal.   Pulmonary:      Effort: Pulmonary effort is normal.   Musculoskeletal:      Cervical back: Normal range of motion and neck supple.   Neurological:      General: No focal deficit present.      Mental Status: She is alert and oriented to person, place, and time. Mental status is at baseline.   Psychiatric:         Mood and Affect: Mood normal.         Behavior: Behavior normal.         Thought Content: Thought content normal.         Judgment: Judgment normal.         There were no vitals taken for this visit.    Assessment/Plan     Problem List Items Addressed This Visit       COVID-19 - Primary    Current Assessment & Plan     Symptom onset 2/23; Covid positive 2/25  No covid vaccines  Risk factors include asthma  Discussed risks and benefits to antivirals.  Pt would like to see how she feels tomorrow and will write via [employer] if she desires Paxlovid.

## 2024-02-26 NOTE — ASSESSMENT & PLAN NOTE
Symptom onset 2/23; Covid positive 2/25  No covid vaccines  Risk factors include asthma  Discussed risks and benefits to antivirals.  Pt would like to see how she feels tomorrow and will write via [employer] if she desires Paxlovid.

## 2024-02-29 ENCOUNTER — APPOINTMENT (OUTPATIENT)
Dept: PRIMARY CARE | Facility: CLINIC | Age: 44
End: 2024-02-29
Payer: COMMERCIAL

## 2024-04-26 ENCOUNTER — OFFICE VISIT (OUTPATIENT)
Dept: PRIMARY CARE | Facility: CLINIC | Age: 44
End: 2024-04-26
Payer: COMMERCIAL

## 2024-04-26 VITALS
BODY MASS INDEX: 22.73 KG/M2 | HEIGHT: 68 IN | OXYGEN SATURATION: 98 % | HEART RATE: 90 BPM | DIASTOLIC BLOOD PRESSURE: 67 MMHG | TEMPERATURE: 97.8 F | SYSTOLIC BLOOD PRESSURE: 106 MMHG | WEIGHT: 150 LBS

## 2024-04-26 DIAGNOSIS — J30.9 ALLERGIC RHINITIS, UNSPECIFIED SEASONALITY, UNSPECIFIED TRIGGER: ICD-10-CM

## 2024-04-26 DIAGNOSIS — R68.89 THROAT SYMPTOM: ICD-10-CM

## 2024-04-26 DIAGNOSIS — D80.6 DEFICIENCY OF ANTI-PNEUMOCOCCAL POLYSACCHARIDE ANTIBODY (MULTI): ICD-10-CM

## 2024-04-26 DIAGNOSIS — J45.990 EXERCISE-INDUCED ASTHMA (HHS-HCC): ICD-10-CM

## 2024-04-26 DIAGNOSIS — Z12.31 ENCOUNTER FOR SCREENING MAMMOGRAM FOR BREAST CANCER: ICD-10-CM

## 2024-04-26 DIAGNOSIS — R05.9 COUGH, UNSPECIFIED TYPE: ICD-10-CM

## 2024-04-26 DIAGNOSIS — Z13.6 SCREENING FOR CARDIOVASCULAR CONDITION: ICD-10-CM

## 2024-04-26 DIAGNOSIS — E04.1 THYROID NODULE: ICD-10-CM

## 2024-04-26 DIAGNOSIS — E73.9 LACTOSE INTOLERANCE: ICD-10-CM

## 2024-04-26 DIAGNOSIS — Z12.11 SCREENING FOR COLON CANCER: ICD-10-CM

## 2024-04-26 DIAGNOSIS — Z00.00 ROUTINE ADULT HEALTH MAINTENANCE: Primary | ICD-10-CM

## 2024-04-26 DIAGNOSIS — Z82.49 FAMILY HISTORY OF CORONARY ARTERY DISEASE: ICD-10-CM

## 2024-04-26 DIAGNOSIS — E55.9 VITAMIN D DEFICIENCY: ICD-10-CM

## 2024-04-26 PROBLEM — U07.1 COVID-19: Status: RESOLVED | Noted: 2024-02-26 | Resolved: 2024-04-26

## 2024-04-26 PROBLEM — N92.0 MENORRHAGIA WITH REGULAR CYCLE: Status: RESOLVED | Noted: 2023-04-21 | Resolved: 2024-04-26

## 2024-04-26 PROBLEM — J01.00 SUBACUTE MAXILLARY SINUSITIS: Status: RESOLVED | Noted: 2023-04-21 | Resolved: 2024-04-26

## 2024-04-26 PROBLEM — R10.33 UMBILICAL PAIN: Status: RESOLVED | Noted: 2023-04-21 | Resolved: 2024-04-26

## 2024-04-26 PROBLEM — J02.9 SORE THROAT: Status: RESOLVED | Noted: 2023-04-21 | Resolved: 2024-04-26

## 2024-04-26 PROCEDURE — 99396 PREV VISIT EST AGE 40-64: CPT | Performed by: INTERNAL MEDICINE

## 2024-04-26 PROCEDURE — 1036F TOBACCO NON-USER: CPT | Performed by: INTERNAL MEDICINE

## 2024-04-26 RX ORDER — ALBUTEROL SULFATE 90 UG/1
2 AEROSOL, METERED RESPIRATORY (INHALATION) EVERY 4 HOURS PRN
Qty: 18 G | Refills: 5 | Status: SHIPPED | OUTPATIENT
Start: 2024-04-26 | End: 2025-04-26

## 2024-04-26 ASSESSMENT — PROMIS GLOBAL HEALTH SCALE
RATE_PHYSICAL_HEALTH: VERY GOOD
RATE_SOCIAL_SATISFACTION: VERY GOOD
CARRYOUT_PHYSICAL_ACTIVITIES: COMPLETELY
EMOTIONAL_PROBLEMS: RARELY
CARRYOUT_SOCIAL_ACTIVITIES: EXCELLENT
RATE_AVERAGE_FATIGUE: MILD
RATE_AVERAGE_PAIN: 1
RATE_MENTAL_HEALTH: VERY GOOD
RATE_QUALITY_OF_LIFE: VERY GOOD
RATE_GENERAL_HEALTH: GOOD

## 2024-04-26 ASSESSMENT — PATIENT HEALTH QUESTIONNAIRE - PHQ9
1. LITTLE INTEREST OR PLEASURE IN DOING THINGS: NOT AT ALL
2. FEELING DOWN, DEPRESSED OR HOPELESS: NOT AT ALL
SUM OF ALL RESPONSES TO PHQ9 QUESTIONS 1 AND 2: 0

## 2024-04-26 NOTE — ASSESSMENT & PLAN NOTE
Annual Wellness exam completed   Preventive Health History reviewed  Ordered:   Labs    Mammogram   Cscope   PAP done in 2023- need results

## 2024-04-26 NOTE — PROGRESS NOTES
ANNUAL CPE VISIT  Chief Complaint   Patient presents with    Annual Exam     HPI: Saw   Put her on supplements:  Lobelia, Bcomplex. Vit D  No further URIs since    Still has cough and voice going in and out  Feels full in her throat    Still has GI symptoms  Saw ENTL:   History of recurrent rhinosinusitis. Thankfully CT scan shows nothing overly worrisome. She does however have evidence of immunodeficiency to antibody titers for pneumococcus and we will send her for Pneumovax 23. We will see what her responses to that accordingly and plan further intervention depending on outcome of same. Detailed discussion with her in this regard with all questions answered.     Assessment and Plan:  Problem List Items Addressed This Visit          High    Routine adult health maintenance - Primary    Overview         TDaP 4/10/2017      FLu vaccine 10/22/20, 10/1/21, 10/1/22, 10/1/23     Pneumovax 6/29/23      Declined COVID vaccine      PAP/HPV 3/27/14 (-)       Pap 7/20/20 (-), sees GYN      Mamm 12/18/20, 10/19/23         Current Assessment & Plan     Annual Wellness exam completed   Preventive Health History reviewed  Ordered:   Labs    Mammogram   Cscope   PAP done in 2023- need results         Relevant Orders    Comprehensive Metabolic Panel    CBC and Auto Differential    Lipid Panel    Urinalysis with Reflex Culture and Microscopic       Medium    Allergic rhinitis    Overview     On Zyrtec and astelin prn         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)    Overview     S/p ENT consult  History of recurrent rhinosinusitis   CT sinus (-)  + evidence of immunodeficiency to antibody titers for pneumococcus   S/p Pneumovax 23         Encounter for screening mammogram for breast cancer    Relevant Orders    BI mammo bilateral screening tomosynthesis    Exercise-induced asthma (HHS-HCC)    Overview     Alb prn          Lactose intolerance    Current Assessment & Plan     Celiac  (-) in  "2019  Will also check for SIBO given persistent sx         Relevant Orders    Breath Hydrogen Test-Bacterial Overgrowth    Screening for colon cancer    Relevant Orders    Colonoscopy Screening; Average Risk Patient    Thyroid nodule    Overview     US 2019: RIGHT LOBE: A lobulated hypoechoic nodule at the midpole  anteriorly and medially measures 1.1 x 0.6 x 0.8 cm. Few additional  subcentimeter nodules are noted.  LEFT LOBE: several adjacent spongiform nodules, measuring up to 1.7 cm.  US 2021: Near the right upper pole a small complex nodule measured 4.5 x 2.9 x 4.2 mm. Peripheral vascularity was noted. Near the lower pole a vague hypoechoic nodule measured 3.9 x 3.2 x 2.6 mm. Near the left lower pole posteriorly a spongiform nodule measured 1.33 x 0.78 x 1.60 cm. No associated vascularity was noted. Near the left upper pole anteriorly a small hypoechoic nodule measured 2.7 x 2.4 x 2.9 mm          Current Assessment & Plan     Us AND eNDO REFERRAL         Relevant Orders    TSH with reflex to Free T4 if abnormal    US thyroid    Referral to Endocrinology    Vitamin D deficiency    Overview     2019: 23  On supplement  Goal ~50         Relevant Orders    Vitamin D 25-Hydroxy,Total (for eval of Vitamin D levels)     Other Visit Diagnoses       Family history of coronary artery disease        Relevant Orders    CT cardiac scoring wo IV contrast    Screening for cardiovascular condition        Relevant Orders    CT cardiac scoring wo IV contrast    Cough, unspecified type        Relevant Medications    albuterol (Ventolin HFA) 90 mcg/actuation inhaler    Throat symptom        would resume allergy meds  If sx persist needs ENT eval    Relevant Orders    Referral to ENT          ROS otherwise negative aside from what was mentioned above in HPI.    Vitals  /67   Pulse 90   Temp 36.6 °C (97.8 °F)   Ht 1.715 m (5' 7.5\")   Wt 68 kg (150 lb)   SpO2 98%   BMI 23.15 kg/m²   Body mass index is 23.15 " kg/m².  Physical Exam  Gen: Alert, NAD  HEENT:  Normal exam  Neck:  No masses/nodes palpable; Thyroid nontender and without nodules; No EBONY  Respiratory:  Lungs CTAB  CV: RRR  Neuro:  Gross motor and sensory intact  Skin:  No suspicious lesions present  Breast: No masses or axillary lymphadenopathy      Allergies and Medications  Penicillins  Current Outpatient Medications   Medication Instructions    albuterol (Ventolin HFA) 90 mcg/actuation inhaler 2 puffs, inhalation, Every 4 hours PRN    azelastine (Astelin) 137 mcg (0.1 %) nasal spray 1 spray, Each Nostril, 2 times daily, Use in each nostril as directed    b complex vitamins capsule 3 capsules, oral, Daily    cetirizine (ZYRTEC) 10 mg, oral, As needed    cholecalciferol (VITAMIN D-3) 50 mcg, oral    NON FORMULARY 2 each, oral, Daily       Active Problem List  Patient Active Problem List   Diagnosis    Allergic rhinitis    Diastasis recti    Exercise-induced asthma (HHS-HCC)    Flat foot    Lactose intolerance    Thyroid nodule    Vitamin D deficiency    WBC decreased    Routine adult health maintenance    Encounter for screening mammogram for breast cancer    Screening for colon cancer    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    H/O magnetic resonance imaging of pelvis     1/22: 1. Unremarkable evaluation of the uterus and ovaries.     2. No visualized findings of pelvic endometriosis. Please note     evaluation of the umbilicus is limited as it was not imaged on most     sequences. On the few acquired sequences, no definite endometriosis     of the umbilicus    H/O pelvic ultrasound     Abnormal MRI of abdomen     Past Surgical History:   Procedure Laterality Date     OTHER SURGICAL HISTORY  04/23/2019    [place] tooth extraction    TONSILLECTOMY  01/11/2016    Tonsillectomy       Social History     Social History Narrative    Family History       M: Hypothyroidism        F: HTN, CAD/MI age 63        no siblings        MGM: skin Cancer /Melanoma                        PGF: Lung Cancer    PGF: EPI    Puncle: EPI         Social History     · Marriedm 1 daughter        PT/Massotherapist at  [employer]     · Non-smoker      · Social alcohol use

## 2024-06-20 ENCOUNTER — OFFICE VISIT (OUTPATIENT)
Dept: GASTROENTEROLOGY | Facility: CLINIC | Age: 44
End: 2024-06-20
Payer: COMMERCIAL

## 2024-06-20 VITALS
SYSTOLIC BLOOD PRESSURE: 125 MMHG | TEMPERATURE: 96.5 F | WEIGHT: 152 LBS | HEART RATE: 60 BPM | DIASTOLIC BLOOD PRESSURE: 85 MMHG | BODY MASS INDEX: 23.46 KG/M2

## 2024-06-20 DIAGNOSIS — E73.9 LACTOSE INTOLERANCE: ICD-10-CM

## 2024-06-20 PROBLEM — E78.6 LOW HDL (UNDER 40): Status: ACTIVE | Noted: 2024-06-20

## 2024-06-20 PROBLEM — E66.3 OVERWEIGHT WITH BODY MASS INDEX (BMI) 25.0-29.9: Status: ACTIVE | Noted: 2024-06-20

## 2024-06-20 PROBLEM — B37.0 CANDIDIASIS OF MOUTH: Status: ACTIVE | Noted: 2024-06-20

## 2024-06-20 PROBLEM — R42 DIZZINESS: Status: ACTIVE | Noted: 2024-06-20

## 2024-06-20 PROBLEM — R29.898 WEAKNESS OF HAND: Status: ACTIVE | Noted: 2024-06-20

## 2024-06-20 PROBLEM — K58.9 IRRITABLE BOWEL SYNDROME: Status: ACTIVE | Noted: 2024-06-20

## 2024-06-20 PROBLEM — M25.539 PAIN IN WRIST: Status: ACTIVE | Noted: 2024-06-20

## 2024-06-20 PROBLEM — R05.3 CHRONIC COUGH: Status: ACTIVE | Noted: 2023-05-12

## 2024-06-20 PROBLEM — M77.8 TENDINITIS OF WRIST: Status: ACTIVE | Noted: 2024-06-20

## 2024-06-20 PROBLEM — M21.969 DEFORMITY OF FOOT: Status: ACTIVE | Noted: 2024-06-20

## 2024-06-20 PROCEDURE — 91065 BREATH HYDROGEN/METHANE TEST: CPT | Performed by: NURSE PRACTITIONER

## 2024-06-20 NOTE — PROGRESS NOTES
Patient ID: Camelia Brown is a 44 y.o. female.    Breath Hydrogen Test-Dextrose    Date/Time: 6/20/2024 11:41 AM    Performed by: Wanda Lamb MA  Authorized by: Arielle Mayer MD    Consent:     Consent obtained:  Verbal    Consent given by:  Patient  Post-procedure details:     Procedure completion:  Tolerated well, no immediate complications  Hydrogen Breath Analysis Consultation Sheet    Referring Provider: Arielle Mayer MD  [address] [facility]  [place] [address]    Indication: Rule out SIBO    Symptoms: Lactose intolerance    Age: 44 y.o.  Weight:   Vitals:    06/20/24 1138   Weight: 68.9 kg (152 lb)     Substrate: Dextrose   Dose: 75 grams    Last Meal: eggs,fish,water  Recent Antibiotics: PT denies    RESULTS:   Time PPM (H2) APPM* (CH4) CO2 Correction   Baseline #1 0900 2 26 3.6 1.52   Baseline #2 0905 1 24 3.8 1.44   *Challenge Dose Sugar: 0905  15' 09:15 1 29 4.0 1.37   30' 09:30 2 26 3.4 1.61   45' 09:45 1 24 3.9 1.41   60' 10:00 1 27 4.1 1.34   75' 10:15 1 22 4.0 1.37   90' 10:30 1 17 4.2 1.30   105' 10:45 1 25 4.3 1.27   120' 11:00 1 15 4.1 1.34   135' 11:15 1 27 4.1 1.34   150'        165'        180'          Impression: Methane positive SIBO

## 2024-07-01 ENCOUNTER — PATIENT MESSAGE (OUTPATIENT)
Dept: GASTROENTEROLOGY | Facility: CLINIC | Age: 44
End: 2024-07-01
Payer: COMMERCIAL

## 2024-07-01 DIAGNOSIS — K63.829 INTESTINAL METHANOGEN OVERGROWTH: ICD-10-CM

## 2024-07-01 PROBLEM — M25.539 PAIN IN WRIST: Status: RESOLVED | Noted: 2024-06-20 | Resolved: 2024-07-01

## 2024-07-01 PROBLEM — K63.8219 SMALL INTESTINAL BACTERIAL OVERGROWTH (SIBO): Status: ACTIVE | Noted: 2024-07-01

## 2024-07-01 PROBLEM — R29.898 WEAKNESS OF HAND: Status: RESOLVED | Noted: 2024-06-20 | Resolved: 2024-07-01

## 2024-07-01 PROBLEM — R42 DIZZINESS: Status: RESOLVED | Noted: 2024-06-20 | Resolved: 2024-07-01

## 2024-07-01 PROBLEM — M77.8 TENDINITIS OF WRIST: Status: RESOLVED | Noted: 2024-06-20 | Resolved: 2024-07-01

## 2024-07-01 PROBLEM — E66.3 OVERWEIGHT WITH BODY MASS INDEX (BMI) 25.0-29.9: Status: RESOLVED | Noted: 2024-06-20 | Resolved: 2024-07-01

## 2024-07-01 PROBLEM — M21.969 DEFORMITY OF FOOT: Status: RESOLVED | Noted: 2024-06-20 | Resolved: 2024-07-01

## 2024-07-01 PROBLEM — E78.6 LOW HDL (UNDER 40): Status: RESOLVED | Noted: 2024-06-20 | Resolved: 2024-07-01

## 2024-07-01 PROBLEM — M21.40 FLAT FOOT: Status: RESOLVED | Noted: 2023-04-21 | Resolved: 2024-07-01

## 2024-07-02 RX ORDER — NEOMYCIN SULFATE 500 MG/1
500 TABLET ORAL 2 TIMES DAILY
Qty: 28 TABLET | Refills: 0 | Status: SHIPPED | OUTPATIENT
Start: 2024-07-02 | End: 2024-07-16

## 2024-07-02 NOTE — PATIENT COMMUNICATION
Spoke with patient and relayed results.  Patient agreed to the medication.  Patient verbally understood.    Formatted medications.

## 2024-07-03 DIAGNOSIS — K63.829 INTESTINAL METHANOGEN OVERGROWTH: ICD-10-CM

## 2024-07-11 ENCOUNTER — APPOINTMENT (OUTPATIENT)
Dept: RADIOLOGY | Facility: CLINIC | Age: 44
End: 2024-07-11
Payer: COMMERCIAL

## 2024-07-17 ENCOUNTER — LAB (OUTPATIENT)
Dept: LAB | Facility: LAB | Age: 44
End: 2024-07-17
Payer: COMMERCIAL

## 2024-07-17 DIAGNOSIS — E55.9 VITAMIN D DEFICIENCY: ICD-10-CM

## 2024-07-17 DIAGNOSIS — R19.7 DIARRHEA, UNSPECIFIED TYPE: ICD-10-CM

## 2024-07-17 DIAGNOSIS — Z00.00 ROUTINE ADULT HEALTH MAINTENANCE: ICD-10-CM

## 2024-07-17 DIAGNOSIS — E04.1 THYROID NODULE: ICD-10-CM

## 2024-07-17 LAB
25(OH)D3 SERPL-MCNC: 51 NG/ML (ref 30–100)
ALBUMIN SERPL BCP-MCNC: 4.5 G/DL (ref 3.4–5)
ALP SERPL-CCNC: 70 U/L (ref 33–110)
ALT SERPL W P-5'-P-CCNC: 7 U/L (ref 7–45)
ANION GAP SERPL CALC-SCNC: 10 MMOL/L (ref 10–20)
APPEARANCE UR: CLEAR
AST SERPL W P-5'-P-CCNC: 14 U/L (ref 9–39)
BASOPHILS # BLD AUTO: 0.04 X10*3/UL (ref 0–0.1)
BASOPHILS NFR BLD AUTO: 0.7 %
BILIRUB SERPL-MCNC: 1 MG/DL (ref 0–1.2)
BILIRUB UR STRIP.AUTO-MCNC: NEGATIVE MG/DL
BUN SERPL-MCNC: 11 MG/DL (ref 6–23)
CALCIUM SERPL-MCNC: 9.3 MG/DL (ref 8.6–10.3)
CHLORIDE SERPL-SCNC: 104 MMOL/L (ref 98–107)
CHOLEST SERPL-MCNC: 149 MG/DL (ref 0–199)
CHOLESTEROL/HDL RATIO: 3.4
CO2 SERPL-SCNC: 29 MMOL/L (ref 21–32)
COLOR UR: NORMAL
CREAT SERPL-MCNC: 0.76 MG/DL (ref 0.5–1.05)
EGFRCR SERPLBLD CKD-EPI 2021: >90 ML/MIN/1.73M*2
EOSINOPHIL # BLD AUTO: 0.08 X10*3/UL (ref 0–0.7)
EOSINOPHIL NFR BLD AUTO: 1.5 %
ERYTHROCYTE [DISTWIDTH] IN BLOOD BY AUTOMATED COUNT: 12.3 % (ref 11.5–14.5)
GLUCOSE SERPL-MCNC: 81 MG/DL (ref 74–99)
GLUCOSE UR STRIP.AUTO-MCNC: NORMAL MG/DL
HCT VFR BLD AUTO: 38.2 % (ref 36–46)
HDLC SERPL-MCNC: 43.6 MG/DL
HGB BLD-MCNC: 12.9 G/DL (ref 12–16)
HOLD SPECIMEN: NORMAL
IMM GRANULOCYTES # BLD AUTO: 0.02 X10*3/UL (ref 0–0.7)
IMM GRANULOCYTES NFR BLD AUTO: 0.4 % (ref 0–0.9)
KETONES UR STRIP.AUTO-MCNC: NEGATIVE MG/DL
LDLC SERPL CALC-MCNC: 87 MG/DL
LEUKOCYTE ESTERASE UR QL STRIP.AUTO: NEGATIVE
LYMPHOCYTES # BLD AUTO: 0.93 X10*3/UL (ref 1.2–4.8)
LYMPHOCYTES NFR BLD AUTO: 17.2 %
MCH RBC QN AUTO: 32.1 PG (ref 26–34)
MCHC RBC AUTO-ENTMCNC: 33.8 G/DL (ref 32–36)
MCV RBC AUTO: 95 FL (ref 80–100)
MONOCYTES # BLD AUTO: 0.9 X10*3/UL (ref 0.1–1)
MONOCYTES NFR BLD AUTO: 16.6 %
NEUTROPHILS # BLD AUTO: 3.44 X10*3/UL (ref 1.2–7.7)
NEUTROPHILS NFR BLD AUTO: 63.6 %
NITRITE UR QL STRIP.AUTO: NEGATIVE
NON HDL CHOLESTEROL: 105 MG/DL (ref 0–149)
NRBC BLD-RTO: 0 /100 WBCS (ref 0–0)
PH UR STRIP.AUTO: 7 [PH]
PLATELET # BLD AUTO: 199 X10*3/UL (ref 150–450)
POTASSIUM SERPL-SCNC: 4.2 MMOL/L (ref 3.5–5.3)
PROT SERPL-MCNC: 7.2 G/DL (ref 6.4–8.2)
PROT UR STRIP.AUTO-MCNC: NEGATIVE MG/DL
RBC # BLD AUTO: 4.02 X10*6/UL (ref 4–5.2)
RBC # UR STRIP.AUTO: NEGATIVE /UL
SODIUM SERPL-SCNC: 139 MMOL/L (ref 136–145)
SP GR UR STRIP.AUTO: 1.01
TRIGL SERPL-MCNC: 90 MG/DL (ref 0–149)
TSH SERPL-ACNC: 1.26 MIU/L (ref 0.44–3.98)
UROBILINOGEN UR STRIP.AUTO-MCNC: NORMAL MG/DL
VLDL: 18 MG/DL (ref 0–40)
WBC # BLD AUTO: 5.4 X10*3/UL (ref 4.4–11.3)

## 2024-07-17 PROCEDURE — 87329 GIARDIA AG IA: CPT

## 2024-07-17 PROCEDURE — 84443 ASSAY THYROID STIM HORMONE: CPT

## 2024-07-17 PROCEDURE — 36415 COLL VENOUS BLD VENIPUNCTURE: CPT

## 2024-07-17 PROCEDURE — 82306 VITAMIN D 25 HYDROXY: CPT

## 2024-07-17 PROCEDURE — 81003 URINALYSIS AUTO W/O SCOPE: CPT

## 2024-07-17 PROCEDURE — 87328 CRYPTOSPORIDIUM AG IA: CPT

## 2024-07-17 PROCEDURE — 80053 COMPREHEN METABOLIC PANEL: CPT

## 2024-07-17 PROCEDURE — 80061 LIPID PANEL: CPT

## 2024-07-17 PROCEDURE — 85025 COMPLETE CBC W/AUTO DIFF WBC: CPT

## 2024-07-20 LAB
CRYPTOSP AG STL QL IA: NEGATIVE
G LAMBLIA AG STL QL IA: NEGATIVE

## 2024-07-21 LAB — O+P STL MICRO: NEGATIVE

## 2024-08-15 ENCOUNTER — APPOINTMENT (OUTPATIENT)
Dept: RADIOLOGY | Facility: CLINIC | Age: 44
End: 2024-08-15
Payer: COMMERCIAL

## 2024-09-08 NOTE — ADDENDUM NOTE
Addended by: EMILY TRIMBLE on: 5/12/2023 05:25 PM     Modules accepted: Orders    
The patient is a 15y Male complaining of aggressive behavior.

## 2024-09-12 ENCOUNTER — APPOINTMENT (OUTPATIENT)
Dept: GASTROENTEROLOGY | Facility: CLINIC | Age: 44
End: 2024-09-12
Payer: COMMERCIAL

## 2024-09-12 VITALS
BODY MASS INDEX: 23.61 KG/M2 | WEIGHT: 155.8 LBS | HEIGHT: 68 IN | SYSTOLIC BLOOD PRESSURE: 106 MMHG | OXYGEN SATURATION: 99 % | RESPIRATION RATE: 16 BRPM | DIASTOLIC BLOOD PRESSURE: 64 MMHG | HEART RATE: 92 BPM

## 2024-09-12 DIAGNOSIS — R19.4 CHANGE IN BOWEL HABITS: Primary | ICD-10-CM

## 2024-09-12 DIAGNOSIS — R19.7 DIARRHEA, UNSPECIFIED TYPE: ICD-10-CM

## 2024-09-12 PROCEDURE — 3008F BODY MASS INDEX DOCD: CPT | Performed by: INTERNAL MEDICINE

## 2024-09-12 PROCEDURE — 99204 OFFICE O/P NEW MOD 45 MIN: CPT | Performed by: INTERNAL MEDICINE

## 2024-09-12 RX ORDER — SODIUM, POTASSIUM,MAG SULFATES 17.5-3.13G
SOLUTION, RECONSTITUTED, ORAL ORAL
Qty: 354 ML | Refills: 0 | Status: SHIPPED | OUTPATIENT
Start: 2024-09-12 | End: 2024-11-22 | Stop reason: ALTCHOICE

## 2024-09-12 NOTE — PROGRESS NOTES
Subjective   Patient ID: Camelia Brown is a 44 y.o. female who presents for Irritable Bowel Syndrome (IBS symptoms for years, recently dx with SIBO 6 months ago. States when she eats certain foods, she is having to go to the bathroom immediately. No constipation, just diarrhea. No hx colon, mom has diverticulosis with colon resection. ).  HPI       Irritable Bowel Syndrome (IBS symptoms for years, recently dx with SIBO 6 months ago. States when she eats certain foods, she is having to go to the bathroom immediately. No constipation, just diarrhea. No hx colon, mom has diverticulosis with colon resection. ).    Symptoms:  1) Post prandial diarrhea : with certain foods. She has a long list. Oatmeal.  Usually minutes   2) Lips swell up and get dry.   3) Abdominal pain with the above.     Allergy tested: Skin testing positive to cat, mold, tree pollen, grasses, weeds and ragweed     FH : No FH of colon cancer    Had HBT for SIBO which was positive for Methane 06/2024. Was treated with Xifaxin without significant improvement in symptoms.       Current Outpatient Medications on File Prior to Visit   Medication Sig Dispense Refill    albuterol (Ventolin HFA) 90 mcg/actuation inhaler Inhale 2 puffs every 4 hours if needed for wheezing or shortness of breath. 18 g 5    b complex vitamins capsule Take 3 capsules by mouth once daily.      cetirizine (ZyrTEC) 10 mg tablet Take 1 tablet (10 mg) by mouth if needed.      cholecalciferol (Vitamin D-3) 25 MCG (1000 UT) capsule Take 2 capsules (50 mcg) by mouth.      NON FORMULARY Take 2 each by mouth once daily.      azelastine (Astelin) 137 mcg (0.1 %) nasal spray Administer 1 spray into each nostril 2 times a day. Use in each nostril as directed 30 mL 12     No current facility-administered medications on file prior to visit.        Review of Systems   Constitutional: Negative.    Respiratory: Negative.     Cardiovascular: Negative.    Gastrointestinal:  Positive for abdominal  "pain and diarrhea.   Endocrine: Negative.    Genitourinary: Negative.    Skin: Negative.    Neurological: Negative.        Objective   Physical Exam  Constitutional:       Appearance: Normal appearance.   HENT:      Head: Normocephalic and atraumatic.   Cardiovascular:      Rate and Rhythm: Normal rate and regular rhythm.      Pulses: Normal pulses.      Heart sounds: Normal heart sounds.   Pulmonary:      Effort: Pulmonary effort is normal.      Breath sounds: Normal breath sounds.   Abdominal:      General: Abdomen is flat. Bowel sounds are normal.      Palpations: Abdomen is soft.      Tenderness: There is no abdominal tenderness.   Musculoskeletal:         General: Normal range of motion.      Cervical back: Normal range of motion.   Skin:     General: Skin is warm.   Neurological:      Mental Status: She is alert.       /64 (BP Location: Right arm, Patient Position: Sitting, BP Cuff Size: Adult)   Pulse 92   Resp 16   Ht 1.715 m (5' 7.5\")   Wt 70.7 kg (155 lb 12.8 oz)   SpO2 99%   BMI 24.04 kg/m²      Lab Results   Component Value Date    WBC 5.4 07/17/2024    HGB 12.9 07/17/2024    HCT 38.2 07/17/2024    MCV 95 07/17/2024     07/17/2024           No lab exists for component: \"LABALBU\"    No results found for: \"AFP\"  Lab Results   Component Value Date    TSH 1.26 07/17/2024     MR ABDOMEN W AND WO IV CONTRAST  Status: Final result     Signed by    Signed Time Phone Pager   Gildardo Mcfarlane MD 4/14/2022 10:56 [phone]     Exam Information    Status Exam Begun Exam Ended   Final 4/14/2022 09:50 4/14/2022 10:33     Study Result    Narrative & Impression   MRN: [id number]  Patient Name: YAMILE CANALES     STUDY:  MRI ABDOMEN WO/W CONTRAST;  4/14/2022 10:33 am     INDICATION:  see dx  R10.33: Umbilical pain.     COMPARISON:  MR pelvis 01/13/2022     ACCESSION NUMBER(S):  [id number]     ORDERING CLINICIAN:  RAF COBB     TECHNIQUE:  Multiplanar T1 and T2 weighted sequences of the abdomen " were  obtained. 13 ml of  Gadolinium contrast agent Dotarem were  administered intravenously without immediate complication.     FINDINGS:  Exam is severely motion degraded.     There is periumbilical rectus diastasis with the inter-recti distance  measuring 35 mm.. Otherwise there is no measurable mass, cyst, or  fluid collection. No evidence of focal endometriosis identified.     The visualized internal organs grossly appear unremarkable on  severely motion degraded images. No dilated bowel is visualized. No  visualized free fluid. The visualized lymphadenopathy. The visualized  major pelvic vasculature appear patent.     Mid to lower lumbar degenerative disc disease.     IMPRESSION:  Periumbilical rectus diastasis with widening of the distance between  the rectus abdominus muscles to 3.5 cm.. No additional focal  abnormality identified in the periumbilical region on severely motion  degraded exam. No evidence of focal mass, cyst, fluid collection,  hernia, or endometriosis.     Celiac negative.     MRI of pelvis 2022  Periumbilical rectus diastasis with widening of the distance between  the rectus abdominus muscles to 3.5 cm.. No additional focal  abnormality identified in the periumbilical region on severely motion  degraded exam. No evidence of focal mass, cyst, fluid collection,  hernia, or endometriosis.    Assessment/Plan   Diagnoses and all orders for this visit:  Change in bowel habits  -     Calprotectin Stool; Future  -     Esophagogastroduodenoscopy (EGD); Future  -     Colonoscopy Screening; Average Risk Patient; Future  -     sodium,potassium,mag sulfates (Suprep) 17.5-3.13-1.6 gram recon soln solution; Take one bottle twice as directed by the prep instructions  Diarrhea, unspecified type  -     Referral to Gastroenterology  -     Calprotectin Stool; Future  -     Esophagogastroduodenoscopy (EGD); Future  -     Colonoscopy Screening; Average Risk Patient; Future  -     sodium,potassium,mag sulfates  (Suprep) 17.5-3.13-1.6 gram recon soln solution; Take one bottle twice as directed by the prep instructions    Follow up after the tests.

## 2024-09-12 NOTE — PATIENT INSTRUCTIONS
Change in bowel habits  -     Calprotectin Stool; Future  -     Esophagogastroduodenoscopy (EGD); Future  -     Colonoscopy Screening; Average Risk Patient; Future  -     sodium,potassium,mag sulfates (Suprep) 17.5-3.13-1.6 gram recon soln solution; Take one bottle twice as directed by the prep instructions  Diarrhea, unspecified type  -     Referral to Gastroenterology  -     Calprotectin Stool; Future  -     Esophagogastroduodenoscopy (EGD); Future  -     Colonoscopy Screening; Average Risk Patient; Future  -     sodium,potassium,mag sulfates (Suprep) 17.5-3.13-1.6 gram recon soln solution; Take one bottle twice as directed by the prep instructions

## 2024-09-17 ENCOUNTER — TELEPHONE (OUTPATIENT)
Dept: GASTROENTEROLOGY | Facility: CLINIC | Age: 44
End: 2024-09-17
Payer: COMMERCIAL

## 2024-09-26 ENCOUNTER — HOSPITAL ENCOUNTER (OUTPATIENT)
Dept: RADIOLOGY | Facility: CLINIC | Age: 44
Discharge: HOME | End: 2024-09-26
Payer: COMMERCIAL

## 2024-09-26 DIAGNOSIS — K76.89 LIVER CYST: ICD-10-CM

## 2024-09-26 DIAGNOSIS — R16.1 SPLENOMEGALY: ICD-10-CM

## 2024-09-26 DIAGNOSIS — I31.39 PERICARDIAL EFFUSION (HHS-HCC): ICD-10-CM

## 2024-09-26 DIAGNOSIS — Z13.6 SCREENING FOR CARDIOVASCULAR CONDITION: ICD-10-CM

## 2024-09-26 DIAGNOSIS — R91.8 LUNG NODULES: Primary | ICD-10-CM

## 2024-09-26 DIAGNOSIS — Z82.49 FAMILY HISTORY OF CORONARY ARTERY DISEASE: ICD-10-CM

## 2024-09-26 PROCEDURE — 75571 CT HRT W/O DYE W/CA TEST: CPT

## 2024-09-27 DIAGNOSIS — R91.1 LUNG NODULE: ICD-10-CM

## 2024-10-04 ENCOUNTER — OFFICE VISIT (OUTPATIENT)
Dept: ORTHOPEDIC SURGERY | Facility: CLINIC | Age: 44
End: 2024-10-04
Payer: COMMERCIAL

## 2024-10-04 ENCOUNTER — HOSPITAL ENCOUNTER (OUTPATIENT)
Dept: RADIOLOGY | Facility: CLINIC | Age: 44
Discharge: HOME | End: 2024-10-04
Payer: COMMERCIAL

## 2024-10-04 ENCOUNTER — APPOINTMENT (OUTPATIENT)
Dept: ORTHOPEDIC SURGERY | Facility: CLINIC | Age: 44
End: 2024-10-04
Payer: COMMERCIAL

## 2024-10-04 DIAGNOSIS — M25.561 ACUTE PAIN OF RIGHT KNEE: ICD-10-CM

## 2024-10-04 DIAGNOSIS — R16.1 SPLENOMEGALY: ICD-10-CM

## 2024-10-04 DIAGNOSIS — E04.1 THYROID NODULE: ICD-10-CM

## 2024-10-04 DIAGNOSIS — K76.89 LIVER CYST: ICD-10-CM

## 2024-10-04 DIAGNOSIS — S83.90XS SPRAIN OF KNEE, SEQUELA: ICD-10-CM

## 2024-10-04 DIAGNOSIS — M25.561 ACUTE PAIN OF RIGHT KNEE: Primary | ICD-10-CM

## 2024-10-04 PROCEDURE — 76700 US EXAM ABDOM COMPLETE: CPT

## 2024-10-04 PROCEDURE — 76536 US EXAM OF HEAD AND NECK: CPT

## 2024-10-04 PROCEDURE — 73564 X-RAY EXAM KNEE 4 OR MORE: CPT | Mod: RT

## 2024-10-04 PROCEDURE — 99213 OFFICE O/P EST LOW 20 MIN: CPT | Performed by: ORTHOPAEDIC SURGERY

## 2024-10-04 NOTE — PROGRESS NOTES
History: Camelia is here for her right knee.  She had an injury about 10 years ago when she was skiing and twisted her knee awkwardly.  She has had persistent pain but has gotten worse recently.  She feels like something wants to get stuck.  She has tried extensive therapy, medications and other modalities.    Past medical history: Multiple  Medications: Multiple  Allergies: No known drug allergies    Please refer to the intake H&P regarding the patient's review of systems, family history and social history as was done today    HEENT: Normal  Lungs: Clear to auscultation  Heart: RRR  Abdomen: Soft, nontender  Skin: clear  Extremity: She has tenderness over the medial joint line.  Positive medial Indiana's anywhere.  Otherwise full range of motion.  Negative Lachman and posterior drawer.  Mild laxity with varus stress.  Contralateral exam is normal for strength, motion, stability and neurovascular assessment.    Radiographs: X-rays show mild medial joint space narrowing with neutral alignment.    Assessment: Mild right knee DJD, cannot rule out further internal derangement.    Plan: At this point she is tried multiple Dally's.  Therapy medications bracing and other options have not helped.  Given her mechanical symptoms of recommended an MRI.  She is inquiring about PRP but we can see her back to go with the results and discuss these options further.  All questions were answered today with the patient.    This note was generated with voice recognition software and may contain grammatical errors.

## 2024-10-18 ENCOUNTER — HOSPITAL ENCOUNTER (OUTPATIENT)
Dept: CARDIOLOGY | Facility: HOSPITAL | Age: 44
Discharge: HOME | End: 2024-10-18
Payer: COMMERCIAL

## 2024-10-18 DIAGNOSIS — I31.39 PERICARDIAL EFFUSION (HHS-HCC): ICD-10-CM

## 2024-10-18 LAB
AORTIC VALVE PEAK VELOCITY: 1.61 M/S
AV PEAK GRADIENT: 10.4 MMHG
AVA (PEAK VEL): 1.56 CM2
EJECTION FRACTION APICAL 4 CHAMBER: 54.3
EJECTION FRACTION: 53 %
LEFT VENTRICLE INTERNAL DIMENSION DIASTOLE: 4.4 CM (ref 3.5–6)
LEFT VENTRICULAR OUTFLOW TRACT DIAMETER: 1.9 CM
LV EJECTION FRACTION BIPLANE: 54 %
MITRAL VALVE E/A RATIO: 1.18
RIGHT VENTRICLE FREE WALL PEAK S': 14.8 CM/S
RIGHT VENTRICLE PEAK SYSTOLIC PRESSURE: 24 MMHG
TRICUSPID ANNULAR PLANE SYSTOLIC EXCURSION: 2.4 CM

## 2024-10-18 PROCEDURE — 93306 TTE W/DOPPLER COMPLETE: CPT

## 2024-10-21 DIAGNOSIS — I31.39 PERICARDIAL EFFUSION (HHS-HCC): Primary | ICD-10-CM

## 2024-10-29 ENCOUNTER — APPOINTMENT (OUTPATIENT)
Dept: PULMONOLOGY | Facility: CLINIC | Age: 44
End: 2024-10-29
Payer: COMMERCIAL

## 2024-10-29 VITALS
DIASTOLIC BLOOD PRESSURE: 65 MMHG | HEIGHT: 68 IN | SYSTOLIC BLOOD PRESSURE: 114 MMHG | BODY MASS INDEX: 23.89 KG/M2 | HEART RATE: 86 BPM | OXYGEN SATURATION: 97 % | WEIGHT: 157.6 LBS | TEMPERATURE: 98.2 F | RESPIRATION RATE: 18 BRPM

## 2024-10-29 DIAGNOSIS — R06.00 DYSPNEA AND RESPIRATORY ABNORMALITIES: ICD-10-CM

## 2024-10-29 DIAGNOSIS — R06.89 DYSPNEA AND RESPIRATORY ABNORMALITIES: ICD-10-CM

## 2024-10-29 DIAGNOSIS — R91.1 LUNG NODULE: ICD-10-CM

## 2024-10-29 DIAGNOSIS — J30.89 ALLERGIC RHINITIS DUE TO OTHER ALLERGIC TRIGGER, UNSPECIFIED SEASONALITY: ICD-10-CM

## 2024-10-29 DIAGNOSIS — R05.3 CHRONIC COUGH: Primary | ICD-10-CM

## 2024-10-29 PROCEDURE — 99204 OFFICE O/P NEW MOD 45 MIN: CPT | Performed by: NURSE PRACTITIONER

## 2024-10-29 PROCEDURE — 1036F TOBACCO NON-USER: CPT | Performed by: NURSE PRACTITIONER

## 2024-10-29 PROCEDURE — 3008F BODY MASS INDEX DOCD: CPT | Performed by: NURSE PRACTITIONER

## 2024-10-29 ASSESSMENT — ENCOUNTER SYMPTOMS
SHORTNESS OF BREATH: 1
OCCASIONAL FEELINGS OF UNSTEADINESS: 0
COUGH: 1
LOSS OF SENSATION IN FEET: 0
DEPRESSION: 0
SINUS PRESSURE: 1

## 2024-10-29 ASSESSMENT — COLUMBIA-SUICIDE SEVERITY RATING SCALE - C-SSRS
1. IN THE PAST MONTH, HAVE YOU WISHED YOU WERE DEAD OR WISHED YOU COULD GO TO SLEEP AND NOT WAKE UP?: NO
2. HAVE YOU ACTUALLY HAD ANY THOUGHTS OF KILLING YOURSELF?: NO
6. HAVE YOU EVER DONE ANYTHING, STARTED TO DO ANYTHING, OR PREPARED TO DO ANYTHING TO END YOUR LIFE?: NO

## 2024-11-08 ENCOUNTER — APPOINTMENT (OUTPATIENT)
Dept: CARDIOLOGY | Facility: HOSPITAL | Age: 44
End: 2024-11-08
Payer: COMMERCIAL

## 2024-11-13 ENCOUNTER — ANESTHESIA EVENT (OUTPATIENT)
Dept: GASTROENTEROLOGY | Facility: EXTERNAL LOCATION | Age: 44
End: 2024-11-13

## 2024-11-21 ENCOUNTER — TELEMEDICINE (OUTPATIENT)
Dept: PRIMARY CARE | Facility: CLINIC | Age: 44
End: 2024-11-21
Payer: COMMERCIAL

## 2024-11-21 ENCOUNTER — TELEPHONE (OUTPATIENT)
Dept: PRIMARY CARE | Facility: CLINIC | Age: 44
End: 2024-11-21

## 2024-11-21 VITALS — HEART RATE: 86 BPM | SYSTOLIC BLOOD PRESSURE: 114 MMHG | DIASTOLIC BLOOD PRESSURE: 65 MMHG

## 2024-11-21 DIAGNOSIS — J32.9 SINUSITIS, UNSPECIFIED CHRONICITY, UNSPECIFIED LOCATION: Primary | ICD-10-CM

## 2024-11-21 PROCEDURE — 99213 OFFICE O/P EST LOW 20 MIN: CPT | Performed by: NURSE PRACTITIONER

## 2024-11-21 PROCEDURE — 1036F TOBACCO NON-USER: CPT | Performed by: NURSE PRACTITIONER

## 2024-11-21 RX ORDER — AMOXICILLIN AND CLAVULANATE POTASSIUM 875; 125 MG/1; MG/1
875 TABLET, FILM COATED ORAL 2 TIMES DAILY
Qty: 20 TABLET | Refills: 0 | Status: SHIPPED | OUTPATIENT
Start: 2024-11-21 | End: 2024-12-01

## 2024-11-21 ASSESSMENT — ENCOUNTER SYMPTOMS: COUGH: 1

## 2024-11-21 NOTE — PROGRESS NOTES
"Problem List Items Addressed This Visit    None  Visit Diagnoses       Sinusitis, unspecified chronicity, unspecified location    -  Primary    given chronicity augmentin with symptomatic care  prn fu io    Relevant Medications    amoxicillin-pot clavulanate (Augmentin) 875-125 mg tablet             Subjective   Patient ID: Camelia Brown is a 44 y.o. female who presents for Cough and Nasal Congestion (Yellow mucus in nose sometimes/Sx for 2 weeks).  Cough    Hasn't needed alb  No sob or wheeze  No fever  Throat is sore from PND  Cold sx have led to sinus sx  Has recurrent sinus inf     Review of Systems   Respiratory:  Positive for cough.    All other systems reviewed and are negative.      BP Readings from Last 3 Encounters:   11/22/24 120/73   11/21/24 114/65   10/29/24 114/65      Wt Readings from Last 3 Encounters:   11/22/24 67.1 kg (148 lb)   10/29/24 71.5 kg (157 lb 9.6 oz)   09/12/24 70.7 kg (155 lb 12.8 oz)      BMI:   Estimated body mass index is 22.84 kg/m² as calculated from the following:    Height as of 11/22/24: 1.715 m (5' 7.5\").    Weight as of 11/22/24: 67.1 kg (148 lb).    Objective   Physical Exam  Gen: Alert, NAD  Respiratory:  resp effort NL, speaking in complete sentences   Neuro:  coordination intact   Mood: normal    Sitting upright    "

## 2024-11-21 NOTE — TELEPHONE ENCOUNTER
Pt. Called stated she has stuffy nose, congestion, post nasal drip, was sick with cold 4 weeks ago. Believes she has a sinus infection. We have no open appointments today. Please advise.

## 2024-11-22 ENCOUNTER — ANESTHESIA (OUTPATIENT)
Dept: GASTROENTEROLOGY | Facility: EXTERNAL LOCATION | Age: 44
End: 2024-11-22

## 2024-11-22 ENCOUNTER — APPOINTMENT (OUTPATIENT)
Dept: GASTROENTEROLOGY | Facility: EXTERNAL LOCATION | Age: 44
End: 2024-11-22
Payer: COMMERCIAL

## 2024-11-22 VITALS
BODY MASS INDEX: 22.43 KG/M2 | HEART RATE: 80 BPM | WEIGHT: 148 LBS | OXYGEN SATURATION: 98 % | SYSTOLIC BLOOD PRESSURE: 108 MMHG | RESPIRATION RATE: 13 BRPM | DIASTOLIC BLOOD PRESSURE: 53 MMHG | HEIGHT: 68 IN | TEMPERATURE: 97.9 F

## 2024-11-22 DIAGNOSIS — R19.7 DIARRHEA, UNSPECIFIED TYPE: Primary | ICD-10-CM

## 2024-11-22 DIAGNOSIS — R19.4 CHANGE IN BOWEL HABITS: ICD-10-CM

## 2024-11-22 PROBLEM — Z86.0100 HISTORY OF COLON POLYPS: Status: ACTIVE | Noted: 2024-11-22

## 2024-11-22 PROBLEM — B37.0 CANDIDIASIS OF MOUTH: Status: RESOLVED | Noted: 2024-06-20 | Resolved: 2024-11-22

## 2024-11-22 PROCEDURE — 45385 COLONOSCOPY W/LESION REMOVAL: CPT | Performed by: INTERNAL MEDICINE

## 2024-11-22 PROCEDURE — 45380 COLONOSCOPY AND BIOPSY: CPT | Performed by: INTERNAL MEDICINE

## 2024-11-22 PROCEDURE — 88305 TISSUE EXAM BY PATHOLOGIST: CPT | Performed by: PATHOLOGY

## 2024-11-22 PROCEDURE — 43239 EGD BIOPSY SINGLE/MULTIPLE: CPT | Performed by: INTERNAL MEDICINE

## 2024-11-22 PROCEDURE — 88305 TISSUE EXAM BY PATHOLOGIST: CPT | Mod: TC,ELYLAB,WESLAB | Performed by: INTERNAL MEDICINE

## 2024-11-22 RX ORDER — LIDOCAINE HYDROCHLORIDE 20 MG/ML
INJECTION, SOLUTION INFILTRATION; PERINEURAL AS NEEDED
Status: DISCONTINUED | OUTPATIENT
Start: 2024-11-22 | End: 2024-11-22

## 2024-11-22 RX ORDER — SODIUM CHLORIDE 9 MG/ML
INJECTION, SOLUTION INTRAVENOUS CONTINUOUS PRN
Status: DISCONTINUED | OUTPATIENT
Start: 2024-11-22 | End: 2024-11-22

## 2024-11-22 RX ORDER — PROPOFOL 10 MG/ML
INJECTION, EMULSION INTRAVENOUS AS NEEDED
Status: DISCONTINUED | OUTPATIENT
Start: 2024-11-22 | End: 2024-11-22

## 2024-11-22 SDOH — HEALTH STABILITY: MENTAL HEALTH: CURRENT SMOKER: 0

## 2024-11-22 ASSESSMENT — PAIN - FUNCTIONAL ASSESSMENT
PAIN_FUNCTIONAL_ASSESSMENT: 0-10

## 2024-11-22 ASSESSMENT — PAIN SCALES - GENERAL
PAINLEVEL_OUTOF10: 0 - NO PAIN
PAIN_LEVEL: 0
PAINLEVEL_OUTOF10: 0 - NO PAIN

## 2024-11-22 ASSESSMENT — COLUMBIA-SUICIDE SEVERITY RATING SCALE - C-SSRS
6. HAVE YOU EVER DONE ANYTHING, STARTED TO DO ANYTHING, OR PREPARED TO DO ANYTHING TO END YOUR LIFE?: NO
2. HAVE YOU ACTUALLY HAD ANY THOUGHTS OF KILLING YOURSELF?: NO
1. IN THE PAST MONTH, HAVE YOU WISHED YOU WERE DEAD OR WISHED YOU COULD GO TO SLEEP AND NOT WAKE UP?: NO

## 2024-11-22 ASSESSMENT — ENCOUNTER SYMPTOMS
ABDOMINAL DISTENTION: 1
DIARRHEA: 1

## 2024-11-22 NOTE — H&P
History Of Present Illness  Camelia Brown is a 44 y.o. female presenting with chronic diarrhea and abdominal pain.        Past Medical History  Past Medical History:   Diagnosis Date    H/O abdominal ultrasound 10/2024    9 mm simple hepatic cyst. This corresponds to the hypodense lesion seen on CT cardiac scoring.    Normal gallbladder, bile ducts, pancreas, spleen, bilateral kidneys, abdominal aorta, and IVC.    H/O chest x-ray 12/24/2023    normal;  5/23: normal    H/O CT scan 06/2023    CT sinus: CT of the paranasal sinuses, mastoid sinuses and orbits is within  normal limits.    H/O diagnostic tests 06/2024    Methane positve Breath Test    H/O diagnostic ultrasound 10/07/2024    There are several nodules seen which are either unchanged or slightly smaller when compared with the study from 04/30/2019 indicating that these are benign. There is a 4 mm TR 4 nodule in left thyroid lobe with no follow-up needed.    H/O echocardiogram 10/21/2024    1. The left ventricular systolic function is low normal, with a visually estimated ejection fraction of 50-55%.  2. There is no evidence of left ventricular hypertrophy.  3. There is normal right ventricular global systolic function.  4. Right ventricular systolic pressure is within normal limits    H/O echocardiogram 10/21/2024    1. The left ventricular systolic function is low normal, with a visually estimated ejection fraction of 50-55%.  2. There is no evidence of left ventricular hypertrophy.  3. There is normal right ventricular global systolic function.  4. Right ventricular systolic pressure is within normal limits.    H/O magnetic resonance imaging of pelvis     1/22: 1. Unremarkable evaluation of the uterus and ovaries.     2. No visualized findings of pelvic endometriosis. Please note     evaluation of the umbilicus is limited as it was not imaged on most     sequences. On the few acquired sequences, no definite endometriosis     of the umbilicus    H/O pelvic  ultrasound     Abnormal MRI of abdomen     Surgical History  Past Surgical History:   Procedure Laterality Date    OTHER SURGICAL HISTORY  04/23/2019    [place] tooth extraction    TONSILLECTOMY  01/11/2016    Tonsillectomy     Social History  She reports that she has never smoked. She has never used smokeless tobacco. She reports current alcohol use. She reports that she does not use drugs.    Family History  Family History   Problem Relation Name Age of Onset    No Known Problems Mother          see snapshot        Allergies  Allergies   Allergen Reactions    Penicillins GI Upset     Patient has tolerated augmentin      Review of Systems   Gastrointestinal:  Positive for abdominal distention and diarrhea.        Physical Exam  Constitutional:       General: She is awake.      Appearance: Normal appearance.   HENT:      Head: Normocephalic and atraumatic.      Nose: Nose normal.      Mouth/Throat:      Mouth: Mucous membranes are moist.   Eyes:      Pupils: Pupils are equal, round, and reactive to light.   Neck:      Thyroid: No thyroid mass.      Trachea: Phonation normal.   Cardiovascular:      Rate and Rhythm: Normal rate and regular rhythm.      Heart sounds: Normal heart sounds. No murmur heard.     No gallop.   Pulmonary:      Effort: Pulmonary effort is normal. No respiratory distress.      Breath sounds: Normal air entry. No decreased breath sounds, wheezing, rhonchi or rales.   Abdominal:      General: Bowel sounds are normal. There is no distension.      Palpations: Abdomen is soft.      Tenderness: There is no abdominal tenderness.   Musculoskeletal:      Cervical back: Neck supple.      Right lower leg: No edema.      Left lower leg: No edema.   Skin:     General: Skin is warm.      Capillary Refill: Capillary refill takes less than 2 seconds.   Neurological:      General: No focal deficit present.      Mental Status: She is alert and oriented to person, place, and time. Mental status is at baseline.       Cranial Nerves: Cranial nerves 2-12 are intact.      Motor: Motor function is intact.   Psychiatric:         Attention and Perception: Attention and perception normal.         Mood and Affect: Mood normal.         Speech: Speech normal.         Behavior: Behavior normal.          Last Recorded Vitals  There were no vitals taken for this visit.    Assessment/Plan   Chronic diarrhea and abdominal pain.   EGD  Colon cancer screening Colonoscopy      Zenon Briggs MD

## 2024-11-22 NOTE — DISCHARGE INSTRUCTIONS

## 2024-11-22 NOTE — ANESTHESIA POSTPROCEDURE EVALUATION
Patient: Camelia Brown    Procedure Summary       Date: 11/22/24 Room / Location: [facility]    Anesthesia Start: 0940 Anesthesia Stop:     Procedures:       EGD      COLONOSCOPY Diagnosis:       Diarrhea, unspecified type      Change in bowel habits      Diarrhea, unspecified type    Scheduled Providers: Zenon Briggs MD; VANDANA Valencia; Mimi Donato RN Responsible Provider: VANDANA Valencia    Anesthesia Type: MAC ASA Status: 3            Anesthesia Type: MAC    Vitals Value Taken Time   BP 98/52 11/22/24 1011   Temp 36 11/22/24 1011   Pulse 67 11/22/24 1011   Resp 15 11/22/24 1011   SpO2 99 11/22/24 1011       Anesthesia Post Evaluation    Patient location during evaluation: bedside  Patient participation: waiting for patient participation  Level of consciousness: awake  Pain score: 0  Pain management: adequate  Airway patency: patent  Cardiovascular status: acceptable  Respiratory status: acceptable and room air  Hydration status: acceptable  Postoperative Nausea and Vomiting: none      No notable events documented.

## 2024-11-22 NOTE — ANESTHESIA PREPROCEDURE EVALUATION
Patient: Camelia Brown    Procedure Information       Date/Time: 11/22/24 0930    Scheduled providers: Zenon Briggs MD; LENA Valencia-CRNA; Mimi Donato RN    Procedures:       EGD      COLONOSCOPY    Location: [facility]            Relevant Problems   Pulmonary   (+) Exercise-induced asthma (HHS-HCC)   (+) Lung nodules      GI   (+) Irritable bowel syndrome      Liver   (+) Liver cyst      ID   (+) Candidiasis of mouth   (+) Small intestinal bacterial overgrowth (SIBO)       Clinical information reviewed:   Tobacco  Allergies  Meds   Med Hx  Surg Hx  OB Status  Fam Hx  Soc   Hx        NPO Detail:  NPO/Void Status  Carbohydrate Drink Given Prior to Surgery? : Y  Date of Last Liquid: 11/22/24  Time of Last Liquid: 0500  Date of Last Solid: 11/20/24  Time of Last Solid: 2000  Last Intake Type: Clear fluids  Time of Last Void: 0916         Physical Exam    Airway  Mallampati: II     Cardiovascular - normal exam     Dental - normal exam     Pulmonary - normal exam     Abdominal - normal exam       Other findings: Cold 3 weeks ago  Sinus infection currently; afebrile      Anesthesia Plan    History of general anesthesia?: no  History of complications of general anesthesia?: no    ASA 3     MAC   (Preoxygenated 2L prior to procedure.  Patient positioned self to comfort prior to sedation administered; eyes closed; continuous monitoring.)  The patient is not a current smoker.    intravenous induction   Anesthetic plan and risks discussed with patient.    Plan discussed with CRNA.

## 2024-11-26 ENCOUNTER — APPOINTMENT (OUTPATIENT)
Dept: CARDIOLOGY | Facility: CLINIC | Age: 44
End: 2024-11-26
Payer: COMMERCIAL

## 2024-11-26 VITALS
BODY MASS INDEX: 24.01 KG/M2 | WEIGHT: 153 LBS | HEART RATE: 88 BPM | HEIGHT: 67 IN | SYSTOLIC BLOOD PRESSURE: 100 MMHG | DIASTOLIC BLOOD PRESSURE: 60 MMHG | OXYGEN SATURATION: 97 %

## 2024-11-26 DIAGNOSIS — R00.2 PALPITATIONS: Primary | ICD-10-CM

## 2024-11-26 DIAGNOSIS — I31.39 PERICARDIAL EFFUSION (HHS-HCC): ICD-10-CM

## 2024-11-26 PROCEDURE — 1036F TOBACCO NON-USER: CPT | Performed by: INTERNAL MEDICINE

## 2024-11-26 PROCEDURE — 93000 ELECTROCARDIOGRAM COMPLETE: CPT | Performed by: INTERNAL MEDICINE

## 2024-11-26 PROCEDURE — 3008F BODY MASS INDEX DOCD: CPT | Performed by: INTERNAL MEDICINE

## 2024-11-26 PROCEDURE — 99245 OFF/OP CONSLTJ NEW/EST HI 55: CPT | Performed by: INTERNAL MEDICINE

## 2024-11-26 NOTE — PATIENT INSTRUCTIONS
"It was my pleasure to meet you.  I look forward to being your cardiologist.  I am a huge believer in communicating with my patients.  Please contact me at any time, if anything is not clear to you regarding anything we have discussed, or if new questions occur to you.       You should increase your intake of fresh fruits and vegetables.  Try to consume 9-12 servings per day of such foods.  You should increase your intake of deep sea fish such as salmon and tuna.  Try to get two servings per week of fish, but if you are a pregnant woman, talk to your obstetrician before increasing your fish intake.  You should increase your intake of unprocessed nuts such as walnuts or almonds.  Increase your intake of plant-based protein.  You should avoid fried foods.  Don't consume sugary or starchy foods and sugary drinks.  Avoid saturated fats.  Try not to dine at restaurants more than once per month, and don't dine at fast food places.  Try to get 7-9 hours of sleep every night.  Try to get 150 minutes per week of moderate intensity exercise (after I have cleared you to start an exercise program).  Try to maintain the appropriate weight for your height based on body mass index (BMI). Maintain your cholesterol, blood sugar, and blood pressure in the recommended respective normal ranges.  There is a wealth of information on the American Heart Association's website regarding this.  Just Google \"Life's Essential 8\" for more information.   Ask me about any of these details  if you have questions.    As your cardiologist, I will be available to you at any time to answer any question you have concerning your heart health.  My staff, Dian can also answer any questions you may have.  Best of luck.       It is important for us to have an accurate list of the medications, supplements, and their doses.  It is also important for us to have an accurate list of your allergies.  Please bring this information to every appointment.  " This is a vital part of the quality of care you receive through all of your providers.

## 2024-11-26 NOTE — ASSESSMENT & PLAN NOTE
I spoke with the patient  about possible causes of pericardial effusion.  In the vast majority of cases we never identify a cause.  Speaking in large categories of disease, pericardial effusion can be associated with malignancy, collagen vascular disease, or acute or chronic infection.  I explained that as a cardiologist, it is beyond the scope of my practice to evaluate for such problems, but that the patient should talk with their primary care physician about further evaluation as needed.  In particular, the patient tells me she has had episodic appearance of a malar rash, and tells me that she has a history of Raynaud's, raising the possibility of collagen vascular disease.  Again, I advised her to talk this over with her primary care physician.    Orders:    Referral to Cardiology    ECG 12 lead (Clinic Performed)    Follow Up In Cardiology; Future

## 2024-11-26 NOTE — PROGRESS NOTES
Referred by Dr. Leon for please see below      History Of Present Illness:    Camelia Brown is a 44 y.o. female presenting with pericardial effusion.    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 Mayer for evaluation of trivial pericardial effusion and a minimally elevated coronary artery calcium score of 15.    Because of the patient's family history of heart disease, her primary care physician ordered a coronary artery calcium score test.  This study was done in September 2024 measuring her calcium score at 15.  Incidental note was made on that study of the presence of a small pericardial effusion.  Over the past year she sometimes feels a skipped beat that occurs once every couple of months, she feels a skipped beat if she is lyng on her left side.  The patient denies chest discomfort, dyspnea,  orthopnea, PND, syncope, and near syncope.  The patient can do household chores such as vacuuming, cooking, cleaning, and shopping, raking, yard work etc.  She walks , uses a spin bike, or lifts weights for thirty minutes  a couple of times per week.   There has been no change in the patient's functional capacity in the past year.    She consumes 1 alcoholic beverage per week and 1 green tea per day.    The patient's projected 10 year event rate according to VALENTIN with calcium score is 2.4%.      PMH: no history of malignancy; no history of collagen vascular disease; no history of tuberculosis, or Lyme disease; She has a history of sinusitis, and has seen pulmonary for this.     ROS: Positive for episodic malar rash, and positive for episodic Raynaud's.  She has never been checked for collagen vascular disease to her knowledge.  Patient denies unexplained fevers, drenching night sweats, unexplained weight loss.        Past Medical History:  She has a past medical history of H/O abdominal  "ultrasound (10/2024), H/O chest x-ray (12/24/2023), H/O CT scan (06/2023), H/O diagnostic tests (06/2024), H/O diagnostic ultrasound (10/07/2024), H/O echocardiogram (10/21/2024), H/O echocardiogram (10/21/2024), H/O magnetic resonance imaging of pelvis, and H/O pelvic ultrasound.    Past Surgical History:  She has a past surgical history that includes Tonsillectomy (01/11/2016); Other surgical history (04/23/2019); Esophagogastroduodenoscopy (11/22/2024); and Colonoscopy (11/22/2024).      Social History:  She reports that she has never smoked. She has never used smokeless tobacco. She reports current alcohol use. She reports that she does not use drugs.    Family History:  Family History   Problem Relation Name Age of Onset    No Known Problems Mother          see snapshot        Allergies:  Penicillins    Outpatient Medications:  Current Outpatient Medications   Medication Instructions    albuterol (Ventolin HFA) 90 mcg/actuation inhaler 2 puffs, inhalation, Every 4 hours PRN    amoxicillin-pot clavulanate (Augmentin) 875-125 mg tablet 875 mg, oral, 2 times daily    azelastine (Astelin) 137 mcg (0.1 %) nasal spray 1 spray, Each Nostril, 2 times daily, Use in each nostril as directed    b complex vitamins capsule 3 capsules, Daily    cetirizine (ZYRTEC) 10 mg, As needed    cholecalciferol (VITAMIN D-3) 50 mcg    NON FORMULARY 2 each, Daily        Last Recorded Vitals:  Vitals:    11/26/24 1501   BP: 100/60   BP Location: Left arm   Patient Position: Sitting   Pulse: 88   SpO2: 97%   Weight: 69.4 kg (153 lb)   Height: 1.702 m (5' 7\")       Physical Exam:    To my exam, blood pressures 128/76 without pulses paradoxus.    GENERAL:  pleasant 44 year-old  HEENT: No xanthelasma  NECK: Supple, no palpable adenopathy or thyromegaly  CHEST: Clear to auscultation, respiratory effort unlabored  CARDIAC: RRR, normal S1 and S2, no audible murmur,  gallop, carotids are brisk, PMI is not displaced.  I auscultated the patient's " "heart while she was supine, lying in the left lateral decubitus position, and leaning forward.  There is no audible rub.  ABD: Active bowel sounds, nontender, no organomegaly, no evidence of ascites  EXT: No clubbing, cyanosis, edema, or tenderness  NEURO: Awake, alert, appropriate, speech is fluent         Last Labs:  CBC -  Lab Results   Component Value Date    WBC 5.4 07/17/2024    HGB 12.9 07/17/2024    HCT 38.2 07/17/2024    MCV 95 07/17/2024     07/17/2024       CMP -  Lab Results   Component Value Date    CALCIUM 9.3 07/17/2024    PROT 7.2 07/17/2024    ALBUMIN 4.5 07/17/2024    AST 14 07/17/2024    ALT 7 07/17/2024    ALKPHOS 70 07/17/2024    BILITOT 1.0 07/17/2024       LIPID PANEL -   Lab Results   Component Value Date    CHOL 149 07/17/2024    TRIG 90 07/17/2024    HDL 43.6 07/17/2024    CHHDL 3.4 07/17/2024    LDLF 85 01/27/2023    VLDL 18 07/17/2024    NHDL 105 07/17/2024       RENAL FUNCTION PANEL -   Lab Results   Component Value Date    GLUCOSE 81 07/17/2024     07/17/2024    K 4.2 07/17/2024     07/17/2024    CO2 29 07/17/2024    ANIONGAP 10 07/17/2024    BUN 11 07/17/2024    CREATININE 0.76 07/17/2024    CALCIUM 9.3 07/17/2024    ALBUMIN 4.5 07/17/2024        No results found for: \"BNP\", \"HGBA1C\"      Lab review: I have Chemistry CMP:   Lab Results   Component Value Date    ALBUMIN 4.5 07/17/2024    CALCIUM 9.3 07/17/2024    CO2 29 07/17/2024    CREATININE 0.76 07/17/2024    GLUCOSE 81 07/17/2024    BILITOT 1.0 07/17/2024    PROT 7.2 07/17/2024    ALT 7 07/17/2024    AST 14 07/17/2024    ALKPHOS 70 07/17/2024   , Chemistry BMP   Lab Results   Component Value Date    GLUCOSE 81 07/17/2024    CALCIUM 9.3 07/17/2024    CO2 29 07/17/2024    CREATININE 0.76 07/17/2024   , CBC:  Lab Results   Component Value Date    WBC 5.4 07/17/2024    RBC 4.02 07/17/2024    HGB 12.9 07/17/2024    HCT 38.2 07/17/2024    MCV 95 07/17/2024    MCH 32.1 07/17/2024    MCHC 33.8 07/17/2024    RDW 12.3 " 07/17/2024    NRBC 0.0 07/17/2024   , and Lipids:   Lab Results   Component Value Date    CHOL 149 07/17/2024    HDL 43.6 07/17/2024    LDLCALC 87 07/17/2024    TRIG 90 07/17/2024     Diagnostic review: I have independently interpreted the Echocardiogram .  My findings are as summarized in the report.  Imaging review: I have  personally reviewed the result(s) coronary artery calcium score    Assessment/Plan   Assessment & Plan  Pericardial effusion ([facility]-H[facility])  I spoke with the patient  about possible causes of pericardial effusion.  In the vast majority of cases we never identify a cause.  Speaking in large categories of disease, pericardial effusion can be associated with malignancy, collagen vascular disease, or acute or chronic infection.  I explained that as a cardiologist, it is beyond the scope of my practice to evaluate for such problems, but that the patient should talk with their primary care physician about further evaluation as needed.  In particular, the patient tells me she has had episodic appearance of a malar rash, and tells me that she has a history of Raynaud's, raising the possibility of collagen vascular disease.  Again, I advised her to talk this over with her primary care physician.    Orders:    Referral to Cardiology    ECG 12 lead (Clinic Performed)    Follow Up In Cardiology; Future    Palpitations    Orders:    Holter Or Event Cardiac Monitor; Future    Follow Up In Cardiology; Future      The patient's projected 10 year event rate according to AVLENTIN with calcium score is 2.4%.      Reno Mack MD

## 2024-11-29 ASSESSMENT — ENCOUNTER SYMPTOMS
ENDOCRINE NEGATIVE: 1
RESPIRATORY NEGATIVE: 1
CARDIOVASCULAR NEGATIVE: 1
ABDOMINAL PAIN: 1
CONSTITUTIONAL NEGATIVE: 1
DIARRHEA: 1
NEUROLOGICAL NEGATIVE: 1

## 2024-12-05 ENCOUNTER — APPOINTMENT (OUTPATIENT)
Dept: PULMONOLOGY | Facility: CLINIC | Age: 44
End: 2024-12-05
Payer: COMMERCIAL

## 2024-12-10 ENCOUNTER — HOSPITAL ENCOUNTER (OUTPATIENT)
Dept: RESPIRATORY THERAPY | Facility: HOSPITAL | Age: 44
Discharge: HOME | End: 2024-12-10
Payer: COMMERCIAL

## 2024-12-10 ENCOUNTER — LAB (OUTPATIENT)
Dept: LAB | Facility: LAB | Age: 44
End: 2024-12-10
Payer: COMMERCIAL

## 2024-12-10 DIAGNOSIS — R21 MALAR RASH: ICD-10-CM

## 2024-12-10 DIAGNOSIS — B37.31 RECURRENT CANDIDIASIS OF VAGINA: ICD-10-CM

## 2024-12-10 DIAGNOSIS — R06.00 DYSPNEA AND RESPIRATORY ABNORMALITIES: ICD-10-CM

## 2024-12-10 DIAGNOSIS — R05.3 CHRONIC COUGH: ICD-10-CM

## 2024-12-10 DIAGNOSIS — R06.89 DYSPNEA AND RESPIRATORY ABNORMALITIES: ICD-10-CM

## 2024-12-10 DIAGNOSIS — J30.89 ALLERGIC RHINITIS DUE TO OTHER ALLERGIC TRIGGER, UNSPECIFIED SEASONALITY: ICD-10-CM

## 2024-12-10 LAB — ERYTHROCYTE [SEDIMENTATION RATE] IN BLOOD BY WESTERGREN METHOD: 2 MM/H (ref 0–20)

## 2024-12-10 PROCEDURE — 82785 ASSAY OF IGE: CPT

## 2024-12-10 PROCEDURE — 94726 PLETHYSMOGRAPHY LUNG VOLUMES: CPT | Performed by: INTERNAL MEDICINE

## 2024-12-10 PROCEDURE — 94729 DIFFUSING CAPACITY: CPT | Performed by: INTERNAL MEDICINE

## 2024-12-10 PROCEDURE — 94060 EVALUATION OF WHEEZING: CPT

## 2024-12-10 PROCEDURE — 86140 C-REACTIVE PROTEIN: CPT

## 2024-12-10 PROCEDURE — 36415 COLL VENOUS BLD VENIPUNCTURE: CPT

## 2024-12-10 PROCEDURE — 86003 ALLG SPEC IGE CRUDE XTRC EA: CPT

## 2024-12-10 PROCEDURE — 86200 CCP ANTIBODY: CPT

## 2024-12-10 PROCEDURE — 87389 HIV-1 AG W/HIV-1&-2 AB AG IA: CPT

## 2024-12-10 PROCEDURE — 86431 RHEUMATOID FACTOR QUANT: CPT

## 2024-12-10 PROCEDURE — 94060 EVALUATION OF WHEEZING: CPT | Performed by: INTERNAL MEDICINE

## 2024-12-10 PROCEDURE — 82784 ASSAY IGA/IGD/IGG/IGM EACH: CPT

## 2024-12-10 PROCEDURE — 85652 RBC SED RATE AUTOMATED: CPT

## 2024-12-10 PROCEDURE — 86038 ANTINUCLEAR ANTIBODIES: CPT

## 2024-12-10 PROCEDURE — 95012 NITRIC OXIDE EXP GAS DETER: CPT

## 2024-12-11 LAB
A ALTERNATA IGE QN: 3.98 KU/L
A FUMIGATUS IGE QN: <0.1 KU/L
ANA SER QL HEP2 SUBST: NEGATIVE
BERMUDA GRASS IGE QN: <0.1 KU/L
BOXELDER IGE QN: 0.21 KU/L
C HERBARUM IGE QN: <0.1 KU/L
CALIF WALNUT POLN IGE QN: <0.1 KU/L
CAT DANDER IGE QN: 0.15 KU/L
CCP IGG SERPL-ACNC: <1 U/ML
CMN PIGWEED IGE QN: <0.1 KU/L
COMMON RAGWEED IGE QN: 0.38 KU/L
COTTONWOOD IGE QN: <0.1 KU/L
CRP SERPL-MCNC: <0.1 MG/DL
D FARINAE IGE QN: <0.1 KU/L
D PTERONYSS IGE QN: <0.1 KU/L
DOG DANDER IGE QN: <0.1 KU/L
ENGL PLANTAIN IGE QN: <0.1 KU/L
GOOSEFOOT IGE QN: <0.1 KU/L
HIV 1+2 AB+HIV1 P24 AG SERPL QL IA: NONREACTIVE
IGA SERPL-MCNC: 152 MG/DL (ref 70–400)
IGE SERPL-ACNC: 30 IU/ML (ref 0–214)
IGG SERPL-MCNC: 1260 MG/DL (ref 700–1600)
IGM SERPL-MCNC: 274 MG/DL (ref 40–230)
JOHNSON GRASS IGE QN: 0.1 KU/L
KENT BLUE GRASS IGE QN: 0.88 KU/L
LONDON PLANE IGE QN: <0.1 KU/L
MT JUNIPER IGE QN: <0.1 KU/L
P NOTATUM IGE QN: <0.1 KU/L
PECAN/HICK TREE IGE QN: <0.1 KU/L
RHEUMATOID FACT SER NEPH-ACNC: <10 IU/ML (ref 0–15)
ROACH IGE QN: <0.1 KU/L
SALTWORT IGE QN: <0.1 KU/L
SHEEP SORREL IGE QN: <0.1 KU/L
SILVER BIRCH IGE QN: <0.1 KU/L
TIMOTHY IGE QN: 0.5 KU/L
TOTAL IGE SMQN RAST: 30.8 KU/L
WHITE ASH IGE QN: <0.1 KU/L
WHITE ELM IGE QN: <0.1 KU/L
WHITE MULBERRY IGE QN: <0.1 KU/L
WHITE OAK IGE QN: <0.1 KU/L

## 2024-12-13 ENCOUNTER — LAB (OUTPATIENT)
Dept: LAB | Facility: LAB | Age: 44
End: 2024-12-13
Payer: COMMERCIAL

## 2024-12-13 DIAGNOSIS — R19.7 DIARRHEA, UNSPECIFIED TYPE: ICD-10-CM

## 2024-12-13 DIAGNOSIS — R19.4 CHANGE IN BOWEL HABITS: ICD-10-CM

## 2024-12-13 LAB
LABORATORY COMMENT REPORT: NORMAL
MGC ASCENT PFT - FEV1 - POST: 3.22
MGC ASCENT PFT - FEV1 - PRE: 3.15
MGC ASCENT PFT - FEV1 - PREDICTED: 3.08
MGC ASCENT PFT - FVC - POST: 4.3
MGC ASCENT PFT - FVC - PRE: 4.46
MGC ASCENT PFT - FVC - PREDICTED: 3.8
PATH REPORT.FINAL DX SPEC: NORMAL
PATH REPORT.GROSS SPEC: NORMAL
PATH REPORT.RELEVANT HX SPEC: NORMAL
PATH REPORT.TOTAL CANCER: NORMAL

## 2024-12-13 PROCEDURE — 83993 ASSAY FOR CALPROTECTIN FECAL: CPT

## 2024-12-16 DIAGNOSIS — R06.00 DYSPNEA AND RESPIRATORY ABNORMALITIES: Primary | ICD-10-CM

## 2024-12-16 DIAGNOSIS — R06.89 DYSPNEA AND RESPIRATORY ABNORMALITIES: Primary | ICD-10-CM

## 2024-12-16 RX ORDER — METHACHOLINE CHLORIDE 0 MG/3 ML
3 VIAL, NEBULIZER (ML) INHALATION ONCE AS NEEDED
OUTPATIENT
Start: 2024-12-16

## 2024-12-16 RX ORDER — METHACHOLINE CHLORIDE 3 MG/3 ML
3 VIAL, NEBULIZER (ML) INHALATION ONCE AS NEEDED
OUTPATIENT
Start: 2024-12-16

## 2024-12-16 RX ORDER — METHACHOLINE CHLORIDE 0.75/3ML
0.75 VIAL, NEBULIZER (ML) INHALATION ONCE AS NEEDED
OUTPATIENT
Start: 2024-12-16

## 2024-12-16 RX ORDER — ALBUTEROL SULFATE 90 UG/1
4 INHALANT RESPIRATORY (INHALATION) EVERY 10 MIN PRN
OUTPATIENT
Start: 2024-12-16

## 2024-12-16 RX ORDER — METHACHOLINE CHLORIDE 12 MG/3 ML
12 VIAL, NEBULIZER (ML) INHALATION ONCE AS NEEDED
OUTPATIENT
Start: 2024-12-16

## 2024-12-16 RX ORDER — ALBUTEROL SULFATE 0.83 MG/ML
3 SOLUTION RESPIRATORY (INHALATION) EVERY 10 MIN PRN
OUTPATIENT
Start: 2024-12-16

## 2024-12-16 RX ORDER — METHACHOLINE CHLORIDE 48 MG/3 ML
48 VIAL, NEBULIZER (ML) INHALATION ONCE AS NEEDED
OUTPATIENT
Start: 2024-12-16

## 2024-12-16 RX ORDER — METHACHOLINE CHLORIDE 0.1875/3ML
0.19 VIAL, NEBULIZER (ML) INHALATION ONCE AS NEEDED
OUTPATIENT
Start: 2024-12-16

## 2024-12-16 NOTE — RESULT ENCOUNTER NOTE
During recent upper endoscopy and colonoscopy biopsies were taken from the small bowel, stomach, colon.  Pathology results are completely normal.  Which means no infection or inflammation was seen.  During colonoscopy a benign polyp was seen which was completely removed.  Unfortunately we were not able to retrieve the polyp specimen.  Based on these results I would recommend repeating colonoscopy in 5 years.  Sincerely,

## 2024-12-16 NOTE — RESULT ENCOUNTER NOTE
This is a correction of results note from today.  The polyp which was retrieved and resected was sessile serrated adenoma.  This is a benign but precancerous polyp.  Based on these results repeat colonoscopy is recommended in 5 years.  Sincerely,

## 2024-12-19 LAB — CALPROTECTIN STL-MCNT: 29 UG/G

## 2025-01-02 ENCOUNTER — APPOINTMENT (OUTPATIENT)
Dept: PULMONOLOGY | Facility: CLINIC | Age: 45
End: 2025-01-02
Payer: COMMERCIAL

## 2025-05-07 ASSESSMENT — PROMIS GLOBAL HEALTH SCALE
CARRYOUT_SOCIAL_ACTIVITIES: VERY GOOD
EMOTIONAL_PROBLEMS: RARELY
RATE_QUALITY_OF_LIFE: VERY GOOD
RATE_GENERAL_HEALTH: VERY GOOD
RATE_AVERAGE_PAIN: 1
CARRYOUT_PHYSICAL_ACTIVITIES: COMPLETELY
RATE_SOCIAL_SATISFACTION: VERY GOOD
RATE_AVERAGE_FATIGUE: MILD
RATE_MENTAL_HEALTH: VERY GOOD
RATE_PHYSICAL_HEALTH: GOOD

## 2025-05-08 ENCOUNTER — APPOINTMENT (OUTPATIENT)
Dept: PRIMARY CARE | Facility: CLINIC | Age: 45
End: 2025-05-08
Payer: COMMERCIAL

## 2025-05-08 VITALS
HEART RATE: 81 BPM | WEIGHT: 150.4 LBS | HEIGHT: 68 IN | SYSTOLIC BLOOD PRESSURE: 93 MMHG | OXYGEN SATURATION: 98 % | DIASTOLIC BLOOD PRESSURE: 58 MMHG | BODY MASS INDEX: 22.79 KG/M2 | TEMPERATURE: 97.6 F

## 2025-05-08 DIAGNOSIS — J45.990 EXERCISE-INDUCED ASTHMA: ICD-10-CM

## 2025-05-08 DIAGNOSIS — Z12.31 ENCOUNTER FOR SCREENING MAMMOGRAM FOR BREAST CANCER: ICD-10-CM

## 2025-05-08 DIAGNOSIS — E73.9 LACTOSE INTOLERANCE: ICD-10-CM

## 2025-05-08 DIAGNOSIS — Z00.00 ROUTINE ADULT HEALTH MAINTENANCE: Primary | ICD-10-CM

## 2025-05-08 DIAGNOSIS — E04.1 THYROID NODULE: ICD-10-CM

## 2025-05-08 DIAGNOSIS — K63.8219 SMALL INTESTINAL BACTERIAL OVERGROWTH (SIBO): ICD-10-CM

## 2025-05-08 DIAGNOSIS — M62.89 MUSCLE TIGHTNESS: ICD-10-CM

## 2025-05-08 DIAGNOSIS — D80.6 DEFICIENCY OF ANTI-PNEUMOCOCCAL POLYSACCHARIDE ANTIBODY (MULTI): ICD-10-CM

## 2025-05-08 DIAGNOSIS — E55.9 VITAMIN D DEFICIENCY: ICD-10-CM

## 2025-05-08 PROBLEM — D72.819 WBC DECREASED: Status: RESOLVED | Noted: 2023-04-21 | Resolved: 2025-05-08

## 2025-05-08 PROBLEM — R00.2 PALPITATIONS: Status: RESOLVED | Noted: 2024-11-26 | Resolved: 2025-05-08

## 2025-05-08 PROCEDURE — 3008F BODY MASS INDEX DOCD: CPT | Performed by: INTERNAL MEDICINE

## 2025-05-08 PROCEDURE — 1036F TOBACCO NON-USER: CPT | Performed by: INTERNAL MEDICINE

## 2025-05-08 PROCEDURE — 99396 PREV VISIT EST AGE 40-64: CPT | Performed by: INTERNAL MEDICINE

## 2025-05-08 NOTE — ASSESSMENT & PLAN NOTE
Annual Wellness exam completed   Preventive Health History reviewed  Ordered:   Labs    Mammogram   PAP due 2026

## 2025-05-08 NOTE — PROGRESS NOTES
ANNUAL CPE VISIT  Chief Complaint   Patient presents with    Annual Exam     Patient is requesting PAP     HPI: Reviewed chart/medical care received since last seen by me.    Has methane (+) ARIELO  Saw GI and he didn't recommend treatment  Sx are controlled with diet    Saw Cardiology 11/24:  Pericardial effusion (HHS-HCC)  I spoke with the patient  about possible causes of pericardial effusion.  In the vast majority of cases we never identify a cause.  Speaking in large categories of disease, pericardial effusion can be associated with malignancy, collagen vascular disease, or acute or chronic infection.  I explained that as a cardiologist, it is beyond the scope of my practice to evaluate for such problems, but that the patient should talk with their primary care physician about further evaluation as needed.  In particular, the patient tells me she has had episodic appearance of a malar rash, and tells me that she has a history of Raynaud's, raising the possibility of collagen vascular disease.  Again, I advised her to talk this over with her primary care physician.    Saw Pulm for SOB 10/24:  Pulm nodules  CT cardiac scoring revealed Mild diffuse bronchial wall thickening. Mild patchy bibasilar infiltrates  and/or atelectasis. A few small nodules along the right major fissure up to 5-6 mm   - designated CT chest ordered      Cough/Dyspnea  - Obtain pulmonary function test, FENO and 6 minute walk test   - immunoglobin IgM elevated      allergic rhinitis  - purchase nasal saline over the counter - use 2-3 x per day to rinse out nasal passages and keep them moist   - cont azelastine - remember to aim towards your ear   - cont Claritin daily at nighttime   - check IG E and RAST   - normal Immunoglobins     PFTS were normal  US thyroid: There are several nodules seen which are either unchanged or slightly  smaller when compared with the study from 04/30/2019 indicating that  these are benign. There is a 4 mm TR 4  nodule in left thyroid lobe  with no follow-up needed.    Labss:  Component      Latest Ref Rng 12/10/2024   IgG      700 - 1,600 mg/dL 1,260    IgA      70 - 400 mg/dL 152    IgM      40 - 230 mg/dL 274 (H)    IgE      0 - 214 IU/mL 30    ELENA      Negative  Negative    Citrulline Antibody, IgG      <3 U/mL <1    Sed Rate      0 - 20 mm/h 2    C-Reactive Protein      <1.00 mg/dL <0.10    Rheumatoid Factor      0 - 15 IU/mL <10    HIV 1/2 Antigen/Antibody Screen with Reflex to Confirmation      Nonreactive  Nonreactive       Legend:  (H) High    Assessment and Plan:  Problem List Items Addressed This Visit          High    Routine adult health maintenance - Primary    Overview       TDaP 4/10/2017      FLu vaccine 10/22/20, 10/1/21, 10/1/22, 10/1/23     Pneumovax 6/29/23      Declined COVID vaccine      PAP/HPV 3/27/14 (-)       Pap 7/20/20 (-), 9/29/23 (-)       Mamm 12/18/20, 10/19/23  Cscope 11/22/24 (5yr)         Current Assessment & Plan   Annual Wellness exam completed   Preventive Health History reviewed  Ordered:   Labs    Mammogram   PAP due 2026           Relevant Orders    Comprehensive Metabolic Panel    CBC and Auto Differential    Lipid Panel    Urinalysis with Reflex Microscopic       Medium    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)    Overview   S/p ENT consult  History of recurrent rhinosinusitis   CT sinus (-)  EGD 11/24 (-)  + evidence of immunodeficiency to antibody titers for pneumococcus   S/p Pneumovax 23         Encounter for screening mammogram for breast cancer    Relevant Orders    BI mammo bilateral screening tomosynthesis    Exercise-induced asthma    Overview   PFTs 2024: normal  Alb prn          Lactose intolerance    Overview   6/24: Methane positve Breath Test   Saw GI, no treatment recommend  On Lactain and probiotic  Symptoms controlled with diet for now         Small intestinal bacterial overgrowth (SIBO)    Overview   Intestinal methanogen overgrowth (IMO)  "by breath test 6/24  Saw GI, no treatment recommend  On Lactaid and probiotic  Symptoms controlled with diet for now         Thyroid nodule    Overview   US 2019: RIGHT LOBE: A lobulated hypoechoic nodule at the midpole  anteriorly and medially measures 1.1 x 0.6 x 0.8 cm. Few additional  subcentimeter nodules are noted.  LEFT LOBE: several adjacent spongiform nodules, measuring up to 1.7 cm.  US 2021: Near the right upper pole a small complex nodule measured 4.5 x 2.9 x 4.2 mm. Peripheral vascularity was noted. Near the lower pole a vague hypoechoic nodule measured 3.9 x 3.2 x 2.6 mm. Near the left lower pole posteriorly a spongiform nodule measured 1.33 x 0.78 x 1.60 cm. No associated vascularity was noted. Near the left upper pole anteriorly a small hypoechoic nodule measured 2.7 x 2.4 x 2.9 mm   US thyroid 10/2024:  Addendum: Based on ACR recommendations, a 4 mm TR 4 nodule despite being  classified as moderately suspicious does not require follow-up due to  a size less than 1 cm. Follow-up for TR 4 nodules is recommended when  the nodule measures greater than or equal to 1 cm but less than 1.5  cm.  There are several nodules seen which are either unchanged or slightly  smaller when compared with the study from 04/30/2019 indicating that  these are benign. There is a 4 mm TR 4 nodule in left thyroid lobe  with no follow-up needed.         Relevant Orders    TSH with reflex to Free T4 if abnormal    Vitamin D deficiency    Overview   2019: 23  On supplement  Goal ~50          Other Visit Diagnoses         Muscle tightness        all over  rec'd massage    Relevant Orders    Massage Therapy eval and treat    Referral to [facility]            ROS otherwise negative aside from what was mentioned above in HPI.    Vitals  BP 93/58   Pulse 81   Temp 36.4 °C (97.6 °F)   Ht 1.727 m (5' 8\")   Wt 68.2 kg (150 lb 6.4 oz)   SpO2 98%   BMI 22.87 kg/m²   Body mass index is 22.87 kg/m².  Physical Exam  Gen: " Alert, NAD  HEENT:  Normal exam  Neck:  No masses/nodes palpable; Thyroid nontender and without nodules; No EBONY  Respiratory:  Lungs CTAB  CV: RRR  Neuro:  Gross motor and sensory intact  Skin:  No suspicious lesions present  Breast: No masses or axillary lymphadenopathy  Gyn: Normal pelvic exam: no uterine masses or cervical lesions, or CMT; no vaginal D/C. No ovarian or adnexal masses; No external vaginal or anal/perineal lesions (Pt declined chaperone)      Allergies and Medications  Penicillins  Current Outpatient Medications   Medication Instructions    azelastine (Astelin) 137 mcg (0.1 %) nasal spray 1 spray, Each Nostril, 2 times daily, Use in each nostril as directed    b complex vitamins capsule 3 capsules, Daily    cetirizine (ZYRTEC) 10 mg, As needed    cholecalciferol (VITAMIN D-3) 50 mcg       Active Problem List  Problem List[1]    Comprehensive Medical/Surgical/Social/Family History  Medical History[2]  Surgical History[3]    Social History     Social History Narrative    Family History        M: Hypothyroidism        F: HTN, CAD/MI age 63        no siblings        MGM: skin Cancer /Melanoma                        PGF: Lung Cancer    PGF: EPI    Puncle: EPI         Social History     · , 1 daughter        PT/Massotherapist at  [employer]     · Non-smoker      · Social alcohol use               [1]   Patient Active Problem List  Diagnosis    Allergic rhinitis    Diastasis recti    Exercise-induced asthma    Lactose intolerance    Thyroid nodule    Vitamin D deficiency    Routine adult health maintenance    Encounter for screening mammogram for breast cancer    Screening for colon cancer    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 This corresponds to the hypodense lesion seen on CT cardiac scoring.    Normal gallbladder, bile ducts, pancreas, spleen, bilateral kidneys, abdominal aorta, and IVC.    H/O chest x-ray 12/24/2023    normal;  5/23: normal    H/O CT scan 06/2023    CT sinus: CT of the paranasal sinuses, mastoid sinuses and orbits is within  normal limits.    H/O diagnostic tests 06/2024    Methane positve Breath Test    H/O diagnostic ultrasound 10/07/2024    There are several nodules seen which are either unchanged or slightly smaller when compared with the study from 04/30/2019 indicating that these are benign. There is a 4 mm TR 4 nodule in left thyroid lobe with no follow-up needed.    H/O echocardiogram 10/21/2024    1. The left ventricular systolic function is low normal, with a visually estimated ejection fraction of 50-55%.  2. There is no evidence of left ventricular hypertrophy.  3. There is normal right ventricular global systolic function.  4. Right ventricular systolic pressure is within normal limits    H/O echocardiogram 10/21/2024    1. The left ventricular systolic function is low normal, with a visually estimated ejection fraction of 50-55%.  2. There is no evidence of left ventricular hypertrophy.  3. There is normal right ventricular global systolic function.  4. Right ventricular systolic pressure is within normal limits.    H/O magnetic resonance imaging of pelvis     1/22: 1. Unremarkable evaluation of the uterus and ovaries.     2. No visualized findings of pelvic endometriosis. Please note     evaluation of the umbilicus is limited as it was not imaged on most     sequences. On the few acquired sequences, no definite endometriosis     of the umbilicus    H/O pelvic ultrasound     Abnormal MRI of abdomen   [3]   Past Surgical History:  Procedure Laterality Date    COLONOSCOPY  11/22/2024    One 10 mm flat polyp in the transverse colon; performed  cold snare removal. small internal hemorrhoids    ESOPHAGOGASTRODUODENOSCOPY  11/22/2024    normal    OTHER SURGICAL HISTORY  04/23/2019    [place] tooth extraction    TONSILLECTOMY  01/11/2016    Tonsillectomy

## 2025-05-09 ENCOUNTER — APPOINTMENT (OUTPATIENT)
Dept: PRIMARY CARE | Facility: CLINIC | Age: 45
End: 2025-05-09
Payer: COMMERCIAL

## 2026-06-11 ENCOUNTER — APPOINTMENT (OUTPATIENT)
Dept: PRIMARY CARE | Facility: CLINIC | Age: 46
End: 2026-06-11
Payer: COMMERCIAL